# Patient Record
Sex: FEMALE | Race: OTHER | URBAN - METROPOLITAN AREA
[De-identification: names, ages, dates, MRNs, and addresses within clinical notes are randomized per-mention and may not be internally consistent; named-entity substitution may affect disease eponyms.]

---

## 2024-01-02 ENCOUNTER — INPATIENT (INPATIENT)
Facility: HOSPITAL | Age: 89
LOS: 2 days | Discharge: HOME | DRG: 871 | End: 2024-01-05
Attending: HOSPITALIST | Admitting: STUDENT IN AN ORGANIZED HEALTH CARE EDUCATION/TRAINING PROGRAM
Payer: MEDICARE

## 2024-01-02 VITALS — HEART RATE: 70 BPM | OXYGEN SATURATION: 96 % | RESPIRATION RATE: 16 BRPM

## 2024-01-02 LAB
ALBUMIN SERPL ELPH-MCNC: 3.5 G/DL — SIGNIFICANT CHANGE UP (ref 3.4–5)
ALBUMIN SERPL ELPH-MCNC: 3.5 G/DL — SIGNIFICANT CHANGE UP (ref 3.4–5)
ALP SERPL-CCNC: 62 U/L — SIGNIFICANT CHANGE UP (ref 40–120)
ALP SERPL-CCNC: 62 U/L — SIGNIFICANT CHANGE UP (ref 40–120)
ALT FLD-CCNC: 38 U/L — SIGNIFICANT CHANGE UP (ref 12–42)
ALT FLD-CCNC: 38 U/L — SIGNIFICANT CHANGE UP (ref 12–42)
ANION GAP SERPL CALC-SCNC: 11 MMOL/L — SIGNIFICANT CHANGE UP (ref 9–16)
ANION GAP SERPL CALC-SCNC: 11 MMOL/L — SIGNIFICANT CHANGE UP (ref 9–16)
APPEARANCE UR: ABNORMAL
APPEARANCE UR: ABNORMAL
APTT BLD: 26.4 SEC — SIGNIFICANT CHANGE UP (ref 24.5–35.6)
APTT BLD: 26.4 SEC — SIGNIFICANT CHANGE UP (ref 24.5–35.6)
AST SERPL-CCNC: 47 U/L — HIGH (ref 15–37)
AST SERPL-CCNC: 47 U/L — HIGH (ref 15–37)
BACTERIA # UR AUTO: ABNORMAL /HPF
BACTERIA # UR AUTO: ABNORMAL /HPF
BASOPHILS # BLD AUTO: 0.08 K/UL — SIGNIFICANT CHANGE UP (ref 0–0.2)
BASOPHILS # BLD AUTO: 0.08 K/UL — SIGNIFICANT CHANGE UP (ref 0–0.2)
BASOPHILS NFR BLD AUTO: 0.5 % — SIGNIFICANT CHANGE UP (ref 0–2)
BASOPHILS NFR BLD AUTO: 0.5 % — SIGNIFICANT CHANGE UP (ref 0–2)
BILIRUB SERPL-MCNC: 0.6 MG/DL — SIGNIFICANT CHANGE UP (ref 0.2–1.2)
BILIRUB SERPL-MCNC: 0.6 MG/DL — SIGNIFICANT CHANGE UP (ref 0.2–1.2)
BILIRUB UR-MCNC: NEGATIVE — SIGNIFICANT CHANGE UP
BILIRUB UR-MCNC: NEGATIVE — SIGNIFICANT CHANGE UP
BUN SERPL-MCNC: 26 MG/DL — HIGH (ref 7–23)
BUN SERPL-MCNC: 26 MG/DL — HIGH (ref 7–23)
CALCIUM SERPL-MCNC: 9.1 MG/DL — SIGNIFICANT CHANGE UP (ref 8.5–10.5)
CALCIUM SERPL-MCNC: 9.1 MG/DL — SIGNIFICANT CHANGE UP (ref 8.5–10.5)
CHLORIDE SERPL-SCNC: 100 MMOL/L — SIGNIFICANT CHANGE UP (ref 96–108)
CHLORIDE SERPL-SCNC: 100 MMOL/L — SIGNIFICANT CHANGE UP (ref 96–108)
CK MB BLD-MCNC: 0.61 % — SIGNIFICANT CHANGE UP
CK MB BLD-MCNC: 0.61 % — SIGNIFICANT CHANGE UP
CK MB CFR SERPL CALC: 2.4 NG/ML — SIGNIFICANT CHANGE UP (ref 0.5–3.6)
CK MB CFR SERPL CALC: 2.4 NG/ML — SIGNIFICANT CHANGE UP (ref 0.5–3.6)
CK SERPL-CCNC: 394 U/L — HIGH (ref 26–192)
CK SERPL-CCNC: 394 U/L — HIGH (ref 26–192)
CO2 SERPL-SCNC: 27 MMOL/L — SIGNIFICANT CHANGE UP (ref 22–31)
CO2 SERPL-SCNC: 27 MMOL/L — SIGNIFICANT CHANGE UP (ref 22–31)
COLOR SPEC: YELLOW — SIGNIFICANT CHANGE UP
COLOR SPEC: YELLOW — SIGNIFICANT CHANGE UP
CREAT SERPL-MCNC: 1.87 MG/DL — HIGH (ref 0.5–1.3)
CREAT SERPL-MCNC: 1.87 MG/DL — HIGH (ref 0.5–1.3)
DIFF PNL FLD: ABNORMAL
DIFF PNL FLD: ABNORMAL
EGFR: 25 ML/MIN/1.73M2 — LOW
EGFR: 25 ML/MIN/1.73M2 — LOW
EOSINOPHIL # BLD AUTO: 0.12 K/UL — SIGNIFICANT CHANGE UP (ref 0–0.5)
EOSINOPHIL # BLD AUTO: 0.12 K/UL — SIGNIFICANT CHANGE UP (ref 0–0.5)
EOSINOPHIL NFR BLD AUTO: 0.8 % — SIGNIFICANT CHANGE UP (ref 0–6)
EOSINOPHIL NFR BLD AUTO: 0.8 % — SIGNIFICANT CHANGE UP (ref 0–6)
ETHANOL SERPL-MCNC: <3 MG/DL — SIGNIFICANT CHANGE UP
ETHANOL SERPL-MCNC: <3 MG/DL — SIGNIFICANT CHANGE UP
FLUAV AG NPH QL: SIGNIFICANT CHANGE UP
FLUAV AG NPH QL: SIGNIFICANT CHANGE UP
FLUAV H1 2009 PAND RNA SPEC QL NAA+PROBE: SIGNIFICANT CHANGE UP
FLUAV H1 2009 PAND RNA SPEC QL NAA+PROBE: SIGNIFICANT CHANGE UP
FLUAV H1 RNA SPEC QL NAA+PROBE: SIGNIFICANT CHANGE UP
FLUAV H1 RNA SPEC QL NAA+PROBE: SIGNIFICANT CHANGE UP
FLUAV H3 RNA SPEC QL NAA+PROBE: SIGNIFICANT CHANGE UP
FLUAV H3 RNA SPEC QL NAA+PROBE: SIGNIFICANT CHANGE UP
FLUAV SUBTYP SPEC NAA+PROBE: SIGNIFICANT CHANGE UP
FLUAV SUBTYP SPEC NAA+PROBE: SIGNIFICANT CHANGE UP
FLUBV AG NPH QL: SIGNIFICANT CHANGE UP
FLUBV AG NPH QL: SIGNIFICANT CHANGE UP
FLUBV RNA SPEC QL NAA+PROBE: SIGNIFICANT CHANGE UP
FLUBV RNA SPEC QL NAA+PROBE: SIGNIFICANT CHANGE UP
GLUCOSE SERPL-MCNC: 139 MG/DL — HIGH (ref 70–99)
GLUCOSE SERPL-MCNC: 139 MG/DL — HIGH (ref 70–99)
GLUCOSE UR QL: NEGATIVE MG/DL — SIGNIFICANT CHANGE UP
GLUCOSE UR QL: NEGATIVE MG/DL — SIGNIFICANT CHANGE UP
HCOV PNL SPEC NAA+PROBE: DETECTED
HCOV PNL SPEC NAA+PROBE: DETECTED
HCT VFR BLD CALC: 41.4 % — SIGNIFICANT CHANGE UP (ref 34.5–45)
HCT VFR BLD CALC: 41.4 % — SIGNIFICANT CHANGE UP (ref 34.5–45)
HGB BLD-MCNC: 13.4 G/DL — SIGNIFICANT CHANGE UP (ref 11.5–15.5)
HGB BLD-MCNC: 13.4 G/DL — SIGNIFICANT CHANGE UP (ref 11.5–15.5)
IMM GRANULOCYTES NFR BLD AUTO: 0.7 % — SIGNIFICANT CHANGE UP (ref 0–0.9)
IMM GRANULOCYTES NFR BLD AUTO: 0.7 % — SIGNIFICANT CHANGE UP (ref 0–0.9)
INR BLD: 1.06 — SIGNIFICANT CHANGE UP (ref 0.85–1.18)
INR BLD: 1.06 — SIGNIFICANT CHANGE UP (ref 0.85–1.18)
KETONES UR-MCNC: NEGATIVE MG/DL — SIGNIFICANT CHANGE UP
KETONES UR-MCNC: NEGATIVE MG/DL — SIGNIFICANT CHANGE UP
LACTATE BLDV-MCNC: 1.7 MMOL/L — SIGNIFICANT CHANGE UP (ref 0.5–2)
LACTATE BLDV-MCNC: 1.7 MMOL/L — SIGNIFICANT CHANGE UP (ref 0.5–2)
LACTATE BLDV-MCNC: 3.1 MMOL/L — HIGH (ref 0.5–2)
LACTATE BLDV-MCNC: 3.1 MMOL/L — HIGH (ref 0.5–2)
LEUKOCYTE ESTERASE UR-ACNC: ABNORMAL
LEUKOCYTE ESTERASE UR-ACNC: ABNORMAL
LYMPHOCYTES # BLD AUTO: 21.3 % — SIGNIFICANT CHANGE UP (ref 13–44)
LYMPHOCYTES # BLD AUTO: 21.3 % — SIGNIFICANT CHANGE UP (ref 13–44)
LYMPHOCYTES # BLD AUTO: 3.27 K/UL — SIGNIFICANT CHANGE UP (ref 1–3.3)
LYMPHOCYTES # BLD AUTO: 3.27 K/UL — SIGNIFICANT CHANGE UP (ref 1–3.3)
MAGNESIUM SERPL-MCNC: 2 MG/DL — SIGNIFICANT CHANGE UP (ref 1.6–2.6)
MAGNESIUM SERPL-MCNC: 2 MG/DL — SIGNIFICANT CHANGE UP (ref 1.6–2.6)
MANUAL SMEAR VERIFICATION: SIGNIFICANT CHANGE UP
MANUAL SMEAR VERIFICATION: SIGNIFICANT CHANGE UP
MCHC RBC-ENTMCNC: 30.9 PG — SIGNIFICANT CHANGE UP (ref 27–34)
MCHC RBC-ENTMCNC: 30.9 PG — SIGNIFICANT CHANGE UP (ref 27–34)
MCHC RBC-ENTMCNC: 32.4 GM/DL — SIGNIFICANT CHANGE UP (ref 32–36)
MCHC RBC-ENTMCNC: 32.4 GM/DL — SIGNIFICANT CHANGE UP (ref 32–36)
MCV RBC AUTO: 95.4 FL — SIGNIFICANT CHANGE UP (ref 80–100)
MCV RBC AUTO: 95.4 FL — SIGNIFICANT CHANGE UP (ref 80–100)
MONOCYTES # BLD AUTO: 1.93 K/UL — HIGH (ref 0–0.9)
MONOCYTES # BLD AUTO: 1.93 K/UL — HIGH (ref 0–0.9)
MONOCYTES NFR BLD AUTO: 12.6 % — SIGNIFICANT CHANGE UP (ref 2–14)
MONOCYTES NFR BLD AUTO: 12.6 % — SIGNIFICANT CHANGE UP (ref 2–14)
NEUTROPHILS # BLD AUTO: 9.82 K/UL — HIGH (ref 1.8–7.4)
NEUTROPHILS # BLD AUTO: 9.82 K/UL — HIGH (ref 1.8–7.4)
NEUTROPHILS NFR BLD AUTO: 64.1 % — SIGNIFICANT CHANGE UP (ref 43–77)
NEUTROPHILS NFR BLD AUTO: 64.1 % — SIGNIFICANT CHANGE UP (ref 43–77)
NITRITE UR-MCNC: NEGATIVE — SIGNIFICANT CHANGE UP
NITRITE UR-MCNC: NEGATIVE — SIGNIFICANT CHANGE UP
NRBC # BLD: 0 /100 WBCS — SIGNIFICANT CHANGE UP (ref 0–0)
NRBC # BLD: 0 /100 WBCS — SIGNIFICANT CHANGE UP (ref 0–0)
PH UR: 6.5 — SIGNIFICANT CHANGE UP (ref 5–8)
PH UR: 6.5 — SIGNIFICANT CHANGE UP (ref 5–8)
PLAT MORPH BLD: NORMAL — SIGNIFICANT CHANGE UP
PLAT MORPH BLD: NORMAL — SIGNIFICANT CHANGE UP
PLATELET # BLD AUTO: 414 K/UL — HIGH (ref 150–400)
PLATELET # BLD AUTO: 414 K/UL — HIGH (ref 150–400)
POTASSIUM SERPL-MCNC: 3.4 MMOL/L — LOW (ref 3.5–5.3)
POTASSIUM SERPL-MCNC: 3.4 MMOL/L — LOW (ref 3.5–5.3)
POTASSIUM SERPL-SCNC: 3.4 MMOL/L — LOW (ref 3.5–5.3)
POTASSIUM SERPL-SCNC: 3.4 MMOL/L — LOW (ref 3.5–5.3)
PROT SERPL-MCNC: 7.3 G/DL — SIGNIFICANT CHANGE UP (ref 6.4–8.2)
PROT SERPL-MCNC: 7.3 G/DL — SIGNIFICANT CHANGE UP (ref 6.4–8.2)
PROT UR-MCNC: ABNORMAL MG/DL
PROT UR-MCNC: ABNORMAL MG/DL
PROTHROM AB SERPL-ACNC: 11.6 SEC — SIGNIFICANT CHANGE UP (ref 9.5–13)
PROTHROM AB SERPL-ACNC: 11.6 SEC — SIGNIFICANT CHANGE UP (ref 9.5–13)
RAPID RVP RESULT: DETECTED
RAPID RVP RESULT: DETECTED
RBC # BLD: 4.34 M/UL — SIGNIFICANT CHANGE UP (ref 3.8–5.2)
RBC # BLD: 4.34 M/UL — SIGNIFICANT CHANGE UP (ref 3.8–5.2)
RBC # FLD: 14 % — SIGNIFICANT CHANGE UP (ref 10.3–14.5)
RBC # FLD: 14 % — SIGNIFICANT CHANGE UP (ref 10.3–14.5)
RBC BLD AUTO: NORMAL — SIGNIFICANT CHANGE UP
RBC BLD AUTO: NORMAL — SIGNIFICANT CHANGE UP
RBC CASTS # UR COMP ASSIST: 11 /HPF — HIGH (ref 0–4)
RBC CASTS # UR COMP ASSIST: 11 /HPF — HIGH (ref 0–4)
RSV RNA NPH QL NAA+NON-PROBE: SIGNIFICANT CHANGE UP
RSV RNA NPH QL NAA+NON-PROBE: SIGNIFICANT CHANGE UP
SARS-COV-2 RNA SPEC QL NAA+PROBE: SIGNIFICANT CHANGE UP
SODIUM SERPL-SCNC: 138 MMOL/L — SIGNIFICANT CHANGE UP (ref 132–145)
SODIUM SERPL-SCNC: 138 MMOL/L — SIGNIFICANT CHANGE UP (ref 132–145)
SP GR SPEC: 1.04 — HIGH (ref 1–1.03)
SP GR SPEC: 1.04 — HIGH (ref 1–1.03)
TROPONIN I, HIGH SENSITIVITY RESULT: 103.8 NG/L — HIGH
TROPONIN I, HIGH SENSITIVITY RESULT: 103.8 NG/L — HIGH
TROPONIN I, HIGH SENSITIVITY RESULT: 16.1 NG/L — SIGNIFICANT CHANGE UP
TROPONIN I, HIGH SENSITIVITY RESULT: 16.1 NG/L — SIGNIFICANT CHANGE UP
TROPONIN I, HIGH SENSITIVITY RESULT: 18.4 NG/L — SIGNIFICANT CHANGE UP
TROPONIN I, HIGH SENSITIVITY RESULT: 18.4 NG/L — SIGNIFICANT CHANGE UP
UROBILINOGEN FLD QL: 1 MG/DL — SIGNIFICANT CHANGE UP (ref 0.2–1)
UROBILINOGEN FLD QL: 1 MG/DL — SIGNIFICANT CHANGE UP (ref 0.2–1)
WBC # BLD: 15.32 K/UL — HIGH (ref 3.8–10.5)
WBC # BLD: 15.32 K/UL — HIGH (ref 3.8–10.5)
WBC # FLD AUTO: 15.32 K/UL — HIGH (ref 3.8–10.5)
WBC # FLD AUTO: 15.32 K/UL — HIGH (ref 3.8–10.5)
WBC UR QL: 4 /HPF — SIGNIFICANT CHANGE UP (ref 0–5)
WBC UR QL: 4 /HPF — SIGNIFICANT CHANGE UP (ref 0–5)

## 2024-01-02 PROCEDURE — 99291 CRITICAL CARE FIRST HOUR: CPT

## 2024-01-02 PROCEDURE — 70450 CT HEAD/BRAIN W/O DYE: CPT | Mod: 26,59

## 2024-01-02 PROCEDURE — 0042T: CPT

## 2024-01-02 PROCEDURE — 70496 CT ANGIOGRAPHY HEAD: CPT | Mod: 26

## 2024-01-02 PROCEDURE — 70498 CT ANGIOGRAPHY NECK: CPT | Mod: 26

## 2024-01-02 PROCEDURE — 71045 X-RAY EXAM CHEST 1 VIEW: CPT | Mod: 26

## 2024-01-02 RX ORDER — CEFTRIAXONE 500 MG/1
1000 INJECTION, POWDER, FOR SOLUTION INTRAMUSCULAR; INTRAVENOUS ONCE
Refills: 0 | Status: COMPLETED | OUTPATIENT
Start: 2024-01-02 | End: 2024-01-02

## 2024-01-02 RX ORDER — SODIUM CHLORIDE 9 MG/ML
1000 INJECTION INTRAMUSCULAR; INTRAVENOUS; SUBCUTANEOUS ONCE
Refills: 0 | Status: COMPLETED | OUTPATIENT
Start: 2024-01-02 | End: 2024-01-02

## 2024-01-02 RX ORDER — IBUPROFEN 200 MG
800 TABLET ORAL ONCE
Refills: 0 | Status: COMPLETED | OUTPATIENT
Start: 2024-01-02 | End: 2024-01-02

## 2024-01-02 RX ORDER — ACETAMINOPHEN 500 MG
975 TABLET ORAL ONCE
Refills: 0 | Status: COMPLETED | OUTPATIENT
Start: 2024-01-02 | End: 2024-01-02

## 2024-01-02 RX ORDER — VANCOMYCIN HCL 1 G
1000 VIAL (EA) INTRAVENOUS ONCE
Refills: 0 | Status: COMPLETED | OUTPATIENT
Start: 2024-01-02 | End: 2024-01-02

## 2024-01-02 RX ADMIN — SODIUM CHLORIDE 1000 MILLILITER(S): 9 INJECTION INTRAMUSCULAR; INTRAVENOUS; SUBCUTANEOUS at 14:30

## 2024-01-02 RX ADMIN — CEFTRIAXONE 100 MILLIGRAM(S): 500 INJECTION, POWDER, FOR SOLUTION INTRAMUSCULAR; INTRAVENOUS at 15:47

## 2024-01-02 RX ADMIN — Medication 975 MILLIGRAM(S): at 17:48

## 2024-01-02 RX ADMIN — Medication 250 MILLIGRAM(S): at 20:29

## 2024-01-02 RX ADMIN — SODIUM CHLORIDE 1000 MILLILITER(S): 9 INJECTION INTRAMUSCULAR; INTRAVENOUS; SUBCUTANEOUS at 22:48

## 2024-01-02 RX ADMIN — Medication 800 MILLIGRAM(S): at 17:49

## 2024-01-02 NOTE — ED ADULT NURSE REASSESSMENT NOTE - NS ED NURSE REASSESS COMMENT FT1
Provider ADAMS Murphy aware of pt's BP dropping. Pt placed in Trendelenburg. Pt not dizzy/lightheaded, not tachy. No sx of low BP, awake and alert. IVFs started.

## 2024-01-02 NOTE — ED PROVIDER NOTE - ATTENDING APP SHARED VISIT CONTRIBUTION OF CARE
Patient with altered mental status, initially called as code stroke. Stroke w/u negative, patient then became febrile. Admitted for sepsis.    Gen: NAD. oriented to person and place, not time. HEENT: NCAT, mmm   Chest: RRR, nl S1 and S2, no m/r/g. Resp: CTAB, no w/r/r  Abd: nl BS, soft, nt/nd. Ext: Warm, dry  Neuro: CN II-XII intact, normal and equal strength, sensation, and reflexes bilaterally  Skin: no rash    MDM: Patient with sepsis, unknown source. Requires inpatient admission for IV antibiotics and IV fluids.    Critical care time: 60 minutes.

## 2024-01-02 NOTE — ED PROVIDER NOTE - ATTENDING SHARED VISIT SELECTORS
CM reached out to patient's daughter Tracy Bertrand to discuss the recommendation that was made for STR for patient  Tracy Bertrand is familiar with the process as her mom is currently at [facility] and the preference would be for him to go there if possible  Tracy Bertrand is agreeable at this time to a blanket referral and understands that CM department will provide updates of which facilities would be able to accommodate patient's needs  CM also provided update that PT attempted to work with patient today, however he refused to work with them  She verbalized that she would talk with him about this further  Tracy Bertrand stated that she did get the POA paperwork notarized and is now officially POA  She will email CM with copies of this  [facility] referral made via [facility] [place]  CM department will continue to follow to assist with discharge coordination  EKG/Xray images(s)

## 2024-01-02 NOTE — ED PROVIDER NOTE - OBJECTIVE STATEMENT
88 yo female, Uzbek speaking, here with family, hx HTN, HLD, hypothyroidism, BIB nephew for evaluation for altered mental status. Nephew reports they were driving from [place], patient was sitting in passenger seat when he started he hear "snoring sounds" and then patient vomited shortly afterwards, she has been unresponsive for about 45 minutes now and poorly following commands. Stroke code called in triage. BP 60/40 in triage, poorly follows commands. 89 yo female, Romanian speaking, here with family, hx HTN, HLD, hypothyroidism, BIB nephew for evaluation for altered mental status. Nephew reports they were driving from [place], patient was sitting in passenger seat when he started he hear "snoring sounds" and then patient vomited shortly afterwards, she has been unresponsive for about 45 minutes now and poorly following commands. Stroke code called in triage. BP 60/40 in triage, poorly follows commands. 89 yo female, Chinese speaking, here with family, hx HTN, HLD, hypothyroidism, "mini stroke" previously with no deficits, BIB nephew for evaluation for altered mental status. Nephew reports they were driving from [place], patient was sitting in passenger seat when he started he hear "snoring sounds" and then patient vomited shortly afterwards, she has been unresponsive for about 45 minutes now and poorly following commands. Stroke code called in triage. BP 60/40 in triage, poorly follows commands. 88 yo female, Greenlandic speaking, here with family, hx HTN, HLD, hypothyroidism, "mini stroke" previously with no deficits, BIB nephew for evaluation for altered mental status. Nephew reports they were driving from [place], patient was sitting in passenger seat when he started he hear "snoring sounds" and then patient vomited shortly afterwards, she has been unresponsive for about 45 minutes now and poorly following commands. Stroke code called in triage. BP 60/40 in triage, poorly follows commands.

## 2024-01-02 NOTE — ED ADULT NURSE REASSESSMENT NOTE - NS ED NURSE REASSESS COMMENT FT1
Break coverage for sonido ponce spoke with phu osborn to be admitted medically and will cancel order for NIH observation

## 2024-01-02 NOTE — ED PROVIDER NOTE - CLINICAL SUMMARY MEDICAL DECISION MAKING FREE TEXT BOX
stroke code called in triage, patient altered, poorly following commands, last well known 45 minutes ago, will place stroke workup and sepsis workup, IVF, IV antibiotics, cxr, reassess.

## 2024-01-02 NOTE — ED PROVIDER NOTE - NIH STROKE SCALE: TOTAL
0

## 2024-01-02 NOTE — ED ADULT NURSE NOTE - NSFALLHARMRISKINTERV_ED_ALL_ED
Assistance OOB with selected safe patient handling equipment if applicable/Assistance with ambulation/Communicate risk of Fall with Harm to all staff, patient, and family/Encourage patient to sit up slowly, dangle for a short time, stand at bedside before walking/Monitor gait and stability/Monitor for mental status changes and reorient to person, place, and time, as needed/Orthostatic vital signs/Provide visual cue: red socks, yellow wristband, yellow gown, etc/Reinforce activity limits and safety measures with patient and family/Toileting schedule using arm’s reach rule for commode and bathroom/Use of alarms - bed, stretcher, chair and/or video monitoring/Bed in lowest position, wheels locked, appropriate side rails in place/Call bell, personal items and telephone in reach/Instruct patient to call for assistance before getting out of bed/chair/stretcher/Non-slip footwear applied when patient is off stretcher/[place] to call system/Physically safe environment - no spills, clutter or unnecessary equipment/Purposeful Proactive Rounding/Room/bathroom lighting operational, light cord in reach Assistance OOB with selected safe patient handling equipment if applicable/Assistance with ambulation/Communicate risk of Fall with Harm to all staff, patient, and family/Encourage patient to sit up slowly, dangle for a short time, stand at bedside before walking/Monitor gait and stability/Monitor for mental status changes and reorient to person, place, and time, as needed/Orthostatic vital signs/Provide visual cue: red socks, yellow wristband, yellow gown, etc/Reinforce activity limits and safety measures with patient and family/Toileting schedule using arm’s reach rule for commode and bathroom/Use of alarms - bed, stretcher, chair and/or video monitoring/Bed in lowest position, wheels locked, appropriate side rails in place/Call bell, personal items and telephone in reach/Instruct patient to call for assistance before getting out of bed/chair/stretcher/Non-slip footwear applied when patient is off stretcher/[place] to call system/Physically safe environment - no spills, clutter or unnecessary equipment/Purposeful Proactive Rounding/Room/bathroom lighting operational, light cord in reach

## 2024-01-02 NOTE — ED ADULT NURSE NOTE - CAS EDN DISCHARGE INTERVENTIONS
This note was copied from a baby's chart.  RN in to check to see how breastfeeding/pumping has been going.  Mom is an experienced breast feeder.  Pt will be DC today. Mom was pumping more yesterday but today infant has been nursing more. When RN came into the room Mom was nursing infant on the R side in the cradle hold. Good breast tissue tugging, audible swallowing.  Mom denies any questions or concerns.  Support given.  Enc to call with any needs.      Time spent with pt: 10 min    MARICARMEN Martinez, RN, IBCLC, CLC     IV intact

## 2024-01-02 NOTE — ED ADULT TRIAGE NOTE - CHIEF COMPLAINT QUOTE
as per son and grandson, onset of dizziness and loss f speech 45 mins ago , at triage pt able to obey commands but non verbal , phu osborn reviewed pt a triage Stroke code called

## 2024-01-02 NOTE — ED ADULT NURSE NOTE - OBJECTIVE STATEMENT
Assumed care of pt in CT scan. Per family members pt went unresponsive in car and vomited on herself while making "weird noises." Pt then was very disoriented and not herself, family believes she passed out briefly. At present pt can follow commands, oriented to self but not location/situation. Pt reports feeling very tired and dizzy. Pt noted  to have cough, per family this is new and was not present the past few days.

## 2024-01-02 NOTE — ED PROVIDER NOTE - PHYSICAL EXAMINATION
CONSTITUTIONAL: no apparent distress.   	HEAD: Normocephalic; atraumatic.   	EYES:  conjunctiva and sclera clear  	ENT: normal nose; no rhinorrhea; normal pharynx with no erythema or lesions. dry mucous membranes  	NECK: Supple; non-tender;   	CARDIOVASCULAR: Normal S1, S2; no murmurs, rubs, or gallops. Regular rate and rhythm.   	RESPIRATORY: Breathing easily; breath sounds clear and equal bilaterally; no wheezes, rhonchi, or rales.  	GI: Soft; non-distended; non-tender  	EXT: GOMEZ x 4. currently in wheelchair, unable to assess gait.   	SKIN: Normal for age and race; warm; dry; good turgor; no apparent lesions or rash.   	NEURO: slowly following commands. able to tell me her name, unable to tell me date and location.

## 2024-01-02 NOTE — ED ADULT NURSE REASSESSMENT NOTE - NS ED NURSE REASSESS COMMENT FT1
Unable to document PO trial at correct time so documented at 1430 however trial was conducted at 1745 and pt passed.

## 2024-01-03 DIAGNOSIS — R79.89 OTHER SPECIFIED ABNORMAL FINDINGS OF BLOOD CHEMISTRY: ICD-10-CM

## 2024-01-03 DIAGNOSIS — Z29.9 ENCOUNTER FOR PROPHYLACTIC MEASURES, UNSPECIFIED: ICD-10-CM

## 2024-01-03 DIAGNOSIS — A41.9 SEPSIS, UNSPECIFIED ORGANISM: ICD-10-CM

## 2024-01-03 DIAGNOSIS — I10 ESSENTIAL (PRIMARY) HYPERTENSION: ICD-10-CM

## 2024-01-03 DIAGNOSIS — N17.9 ACUTE KIDNEY FAILURE, UNSPECIFIED: ICD-10-CM

## 2024-01-03 DIAGNOSIS — G93.41 METABOLIC ENCEPHALOPATHY: ICD-10-CM

## 2024-01-03 DIAGNOSIS — I21.A1 MYOCARDIAL INFARCTION TYPE 2: ICD-10-CM

## 2024-01-03 DIAGNOSIS — R55 SYNCOPE AND COLLAPSE: ICD-10-CM

## 2024-01-03 DIAGNOSIS — E03.9 HYPOTHYROIDISM, UNSPECIFIED: ICD-10-CM

## 2024-01-03 DIAGNOSIS — I63.9 CEREBRAL INFARCTION, UNSPECIFIED: ICD-10-CM

## 2024-01-03 LAB
ANION GAP SERPL CALC-SCNC: 12 MMOL/L — SIGNIFICANT CHANGE UP (ref 5–17)
ANION GAP SERPL CALC-SCNC: 12 MMOL/L — SIGNIFICANT CHANGE UP (ref 5–17)
APPEARANCE UR: ABNORMAL
APPEARANCE UR: ABNORMAL
APTT BLD: 29.9 SEC — SIGNIFICANT CHANGE UP (ref 24.5–35.6)
APTT BLD: 29.9 SEC — SIGNIFICANT CHANGE UP (ref 24.5–35.6)
BACTERIA # UR AUTO: ABNORMAL /HPF
BACTERIA # UR AUTO: ABNORMAL /HPF
BASOPHILS # BLD AUTO: 0.09 K/UL — SIGNIFICANT CHANGE UP (ref 0–0.2)
BASOPHILS # BLD AUTO: 0.09 K/UL — SIGNIFICANT CHANGE UP (ref 0–0.2)
BASOPHILS NFR BLD AUTO: 0.4 % — SIGNIFICANT CHANGE UP (ref 0–2)
BASOPHILS NFR BLD AUTO: 0.4 % — SIGNIFICANT CHANGE UP (ref 0–2)
BILIRUB UR-MCNC: NEGATIVE — SIGNIFICANT CHANGE UP
BILIRUB UR-MCNC: NEGATIVE — SIGNIFICANT CHANGE UP
BLD GP AB SCN SERPL QL: NEGATIVE — SIGNIFICANT CHANGE UP
BLD GP AB SCN SERPL QL: NEGATIVE — SIGNIFICANT CHANGE UP
BUN SERPL-MCNC: 26 MG/DL — HIGH (ref 7–23)
BUN SERPL-MCNC: 26 MG/DL — HIGH (ref 7–23)
CALCIUM SERPL-MCNC: 8.4 MG/DL — SIGNIFICANT CHANGE UP (ref 8.4–10.5)
CALCIUM SERPL-MCNC: 8.4 MG/DL — SIGNIFICANT CHANGE UP (ref 8.4–10.5)
CAST: 6 /LPF — HIGH (ref 0–4)
CAST: 6 /LPF — HIGH (ref 0–4)
CHLORIDE SERPL-SCNC: 108 MMOL/L — SIGNIFICANT CHANGE UP (ref 96–108)
CHLORIDE SERPL-SCNC: 108 MMOL/L — SIGNIFICANT CHANGE UP (ref 96–108)
CK MB CFR SERPL CALC: 8.6 NG/ML — HIGH (ref 0–6.7)
CK MB CFR SERPL CALC: 8.6 NG/ML — HIGH (ref 0–6.7)
CK SERPL-CCNC: 1381 U/L — HIGH (ref 25–170)
CK SERPL-CCNC: 1381 U/L — HIGH (ref 25–170)
CO2 SERPL-SCNC: 23 MMOL/L — SIGNIFICANT CHANGE UP (ref 22–31)
CO2 SERPL-SCNC: 23 MMOL/L — SIGNIFICANT CHANGE UP (ref 22–31)
COLOR SPEC: YELLOW — SIGNIFICANT CHANGE UP
COLOR SPEC: YELLOW — SIGNIFICANT CHANGE UP
CREAT SERPL-MCNC: 1.39 MG/DL — HIGH (ref 0.5–1.3)
CREAT SERPL-MCNC: 1.39 MG/DL — HIGH (ref 0.5–1.3)
DIFF PNL FLD: ABNORMAL
DIFF PNL FLD: ABNORMAL
EGFR: 36 ML/MIN/1.73M2 — LOW
EGFR: 36 ML/MIN/1.73M2 — LOW
EOSINOPHIL # BLD AUTO: 0.02 K/UL — SIGNIFICANT CHANGE UP (ref 0–0.5)
EOSINOPHIL # BLD AUTO: 0.02 K/UL — SIGNIFICANT CHANGE UP (ref 0–0.5)
EOSINOPHIL NFR BLD AUTO: 0.1 % — SIGNIFICANT CHANGE UP (ref 0–6)
EOSINOPHIL NFR BLD AUTO: 0.1 % — SIGNIFICANT CHANGE UP (ref 0–6)
GLUCOSE SERPL-MCNC: 96 MG/DL — SIGNIFICANT CHANGE UP (ref 70–99)
GLUCOSE SERPL-MCNC: 96 MG/DL — SIGNIFICANT CHANGE UP (ref 70–99)
GLUCOSE UR QL: NEGATIVE MG/DL — SIGNIFICANT CHANGE UP
GLUCOSE UR QL: NEGATIVE MG/DL — SIGNIFICANT CHANGE UP
HCT VFR BLD CALC: 36.6 % — SIGNIFICANT CHANGE UP (ref 34.5–45)
HCT VFR BLD CALC: 36.6 % — SIGNIFICANT CHANGE UP (ref 34.5–45)
HGB BLD-MCNC: 12.3 G/DL — SIGNIFICANT CHANGE UP (ref 11.5–15.5)
HGB BLD-MCNC: 12.3 G/DL — SIGNIFICANT CHANGE UP (ref 11.5–15.5)
IMM GRANULOCYTES NFR BLD AUTO: 0.4 % — SIGNIFICANT CHANGE UP (ref 0–0.9)
IMM GRANULOCYTES NFR BLD AUTO: 0.4 % — SIGNIFICANT CHANGE UP (ref 0–0.9)
INR BLD: 1 — SIGNIFICANT CHANGE UP (ref 0.85–1.18)
INR BLD: 1 — SIGNIFICANT CHANGE UP (ref 0.85–1.18)
KETONES UR-MCNC: NEGATIVE MG/DL — SIGNIFICANT CHANGE UP
KETONES UR-MCNC: NEGATIVE MG/DL — SIGNIFICANT CHANGE UP
LACTATE SERPL-SCNC: 1.1 MMOL/L — SIGNIFICANT CHANGE UP (ref 0.5–2)
LACTATE SERPL-SCNC: 1.1 MMOL/L — SIGNIFICANT CHANGE UP (ref 0.5–2)
LEUKOCYTE ESTERASE UR-ACNC: ABNORMAL
LEUKOCYTE ESTERASE UR-ACNC: ABNORMAL
LYMPHOCYTES # BLD AUTO: 2.08 K/UL — SIGNIFICANT CHANGE UP (ref 1–3.3)
LYMPHOCYTES # BLD AUTO: 2.08 K/UL — SIGNIFICANT CHANGE UP (ref 1–3.3)
LYMPHOCYTES # BLD AUTO: 9.8 % — LOW (ref 13–44)
LYMPHOCYTES # BLD AUTO: 9.8 % — LOW (ref 13–44)
MAGNESIUM SERPL-MCNC: 1.6 MG/DL — SIGNIFICANT CHANGE UP (ref 1.6–2.6)
MAGNESIUM SERPL-MCNC: 1.6 MG/DL — SIGNIFICANT CHANGE UP (ref 1.6–2.6)
MCHC RBC-ENTMCNC: 31 PG — SIGNIFICANT CHANGE UP (ref 27–34)
MCHC RBC-ENTMCNC: 31 PG — SIGNIFICANT CHANGE UP (ref 27–34)
MCHC RBC-ENTMCNC: 33.6 GM/DL — SIGNIFICANT CHANGE UP (ref 32–36)
MCHC RBC-ENTMCNC: 33.6 GM/DL — SIGNIFICANT CHANGE UP (ref 32–36)
MCV RBC AUTO: 92.2 FL — SIGNIFICANT CHANGE UP (ref 80–100)
MCV RBC AUTO: 92.2 FL — SIGNIFICANT CHANGE UP (ref 80–100)
MONOCYTES # BLD AUTO: 1.54 K/UL — HIGH (ref 0–0.9)
MONOCYTES # BLD AUTO: 1.54 K/UL — HIGH (ref 0–0.9)
MONOCYTES NFR BLD AUTO: 7.3 % — SIGNIFICANT CHANGE UP (ref 2–14)
MONOCYTES NFR BLD AUTO: 7.3 % — SIGNIFICANT CHANGE UP (ref 2–14)
MRSA PCR RESULT.: NEGATIVE — SIGNIFICANT CHANGE UP
MRSA PCR RESULT.: NEGATIVE — SIGNIFICANT CHANGE UP
NEUTROPHILS # BLD AUTO: 17.33 K/UL — HIGH (ref 1.8–7.4)
NEUTROPHILS # BLD AUTO: 17.33 K/UL — HIGH (ref 1.8–7.4)
NEUTROPHILS NFR BLD AUTO: 82 % — HIGH (ref 43–77)
NEUTROPHILS NFR BLD AUTO: 82 % — HIGH (ref 43–77)
NITRITE UR-MCNC: NEGATIVE — SIGNIFICANT CHANGE UP
NITRITE UR-MCNC: NEGATIVE — SIGNIFICANT CHANGE UP
NRBC # BLD: 0 /100 WBCS — SIGNIFICANT CHANGE UP (ref 0–0)
NRBC # BLD: 0 /100 WBCS — SIGNIFICANT CHANGE UP (ref 0–0)
PH UR: 5.5 — SIGNIFICANT CHANGE UP (ref 5–8)
PH UR: 5.5 — SIGNIFICANT CHANGE UP (ref 5–8)
PHOSPHATE SERPL-MCNC: 3.7 MG/DL — SIGNIFICANT CHANGE UP (ref 2.5–4.5)
PHOSPHATE SERPL-MCNC: 3.7 MG/DL — SIGNIFICANT CHANGE UP (ref 2.5–4.5)
PLATELET # BLD AUTO: 316 K/UL — SIGNIFICANT CHANGE UP (ref 150–400)
PLATELET # BLD AUTO: 316 K/UL — SIGNIFICANT CHANGE UP (ref 150–400)
POTASSIUM SERPL-MCNC: 3.6 MMOL/L — SIGNIFICANT CHANGE UP (ref 3.5–5.3)
POTASSIUM SERPL-MCNC: 3.6 MMOL/L — SIGNIFICANT CHANGE UP (ref 3.5–5.3)
POTASSIUM SERPL-SCNC: 3.6 MMOL/L — SIGNIFICANT CHANGE UP (ref 3.5–5.3)
POTASSIUM SERPL-SCNC: 3.6 MMOL/L — SIGNIFICANT CHANGE UP (ref 3.5–5.3)
PROCALCITONIN SERPL-MCNC: 2.44 NG/ML — HIGH (ref 0.02–0.1)
PROCALCITONIN SERPL-MCNC: 2.44 NG/ML — HIGH (ref 0.02–0.1)
PROT UR-MCNC: 30 MG/DL
PROT UR-MCNC: 30 MG/DL
PROTHROM AB SERPL-ACNC: 11.4 SEC — SIGNIFICANT CHANGE UP (ref 9.5–13)
PROTHROM AB SERPL-ACNC: 11.4 SEC — SIGNIFICANT CHANGE UP (ref 9.5–13)
RBC # BLD: 3.97 M/UL — SIGNIFICANT CHANGE UP (ref 3.8–5.2)
RBC # BLD: 3.97 M/UL — SIGNIFICANT CHANGE UP (ref 3.8–5.2)
RBC # FLD: 14.1 % — SIGNIFICANT CHANGE UP (ref 10.3–14.5)
RBC # FLD: 14.1 % — SIGNIFICANT CHANGE UP (ref 10.3–14.5)
RBC CASTS # UR COMP ASSIST: 214 /HPF — HIGH (ref 0–4)
RBC CASTS # UR COMP ASSIST: 214 /HPF — HIGH (ref 0–4)
RH IG SCN BLD-IMP: POSITIVE — SIGNIFICANT CHANGE UP
RH IG SCN BLD-IMP: POSITIVE — SIGNIFICANT CHANGE UP
S AUREUS DNA NOSE QL NAA+PROBE: NEGATIVE — SIGNIFICANT CHANGE UP
S AUREUS DNA NOSE QL NAA+PROBE: NEGATIVE — SIGNIFICANT CHANGE UP
SODIUM SERPL-SCNC: 143 MMOL/L — SIGNIFICANT CHANGE UP (ref 135–145)
SODIUM SERPL-SCNC: 143 MMOL/L — SIGNIFICANT CHANGE UP (ref 135–145)
SP GR SPEC: 1.02 — SIGNIFICANT CHANGE UP (ref 1–1.03)
SP GR SPEC: 1.02 — SIGNIFICANT CHANGE UP (ref 1–1.03)
SQUAMOUS # UR AUTO: 7 /HPF — HIGH (ref 0–5)
SQUAMOUS # UR AUTO: 7 /HPF — HIGH (ref 0–5)
TROPONIN T, HIGH SENSITIVITY RESULT: 105 NG/L — CRITICAL HIGH (ref 0–51)
TROPONIN T, HIGH SENSITIVITY RESULT: 105 NG/L — CRITICAL HIGH (ref 0–51)
TROPONIN T, HIGH SENSITIVITY RESULT: 74 NG/L — CRITICAL HIGH (ref 0–51)
TROPONIN T, HIGH SENSITIVITY RESULT: 74 NG/L — CRITICAL HIGH (ref 0–51)
TSH SERPL-MCNC: 1.92 UIU/ML — SIGNIFICANT CHANGE UP (ref 0.27–4.2)
TSH SERPL-MCNC: 1.92 UIU/ML — SIGNIFICANT CHANGE UP (ref 0.27–4.2)
UROBILINOGEN FLD QL: 0.2 MG/DL — SIGNIFICANT CHANGE UP (ref 0.2–1)
UROBILINOGEN FLD QL: 0.2 MG/DL — SIGNIFICANT CHANGE UP (ref 0.2–1)
WBC # BLD: 21.14 K/UL — HIGH (ref 3.8–10.5)
WBC # BLD: 21.14 K/UL — HIGH (ref 3.8–10.5)
WBC # FLD AUTO: 21.14 K/UL — HIGH (ref 3.8–10.5)
WBC # FLD AUTO: 21.14 K/UL — HIGH (ref 3.8–10.5)
WBC UR QL: 15 /HPF — HIGH (ref 0–5)
WBC UR QL: 15 /HPF — HIGH (ref 0–5)

## 2024-01-03 PROCEDURE — 71045 X-RAY EXAM CHEST 1 VIEW: CPT | Mod: 26,XU

## 2024-01-03 PROCEDURE — 71046 X-RAY EXAM CHEST 2 VIEWS: CPT | Mod: 26

## 2024-01-03 PROCEDURE — 99223 1ST HOSP IP/OBS HIGH 75: CPT | Mod: GC

## 2024-01-03 PROCEDURE — 93306 TTE W/DOPPLER COMPLETE: CPT | Mod: 26

## 2024-01-03 RX ORDER — POTASSIUM CHLORIDE 20 MEQ
20 PACKET (EA) ORAL ONCE
Refills: 0 | Status: COMPLETED | OUTPATIENT
Start: 2024-01-03 | End: 2024-01-03

## 2024-01-03 RX ORDER — LEVOTHYROXINE SODIUM 125 MCG
75 TABLET ORAL DAILY
Refills: 0 | Status: DISCONTINUED | OUTPATIENT
Start: 2024-01-03 | End: 2024-01-05

## 2024-01-03 RX ORDER — ONDANSETRON 8 MG/1
4 TABLET, FILM COATED ORAL EVERY 8 HOURS
Refills: 0 | Status: DISCONTINUED | OUTPATIENT
Start: 2024-01-03 | End: 2024-01-05

## 2024-01-03 RX ORDER — ATORVASTATIN CALCIUM 80 MG/1
40 TABLET, FILM COATED ORAL AT BEDTIME
Refills: 0 | Status: DISCONTINUED | OUTPATIENT
Start: 2024-01-03 | End: 2024-01-05

## 2024-01-03 RX ORDER — ACETAMINOPHEN 500 MG
650 TABLET ORAL EVERY 6 HOURS
Refills: 0 | Status: DISCONTINUED | OUTPATIENT
Start: 2024-01-03 | End: 2024-01-05

## 2024-01-03 RX ORDER — HEPARIN SODIUM 5000 [USP'U]/ML
5000 INJECTION INTRAVENOUS; SUBCUTANEOUS EVERY 8 HOURS
Refills: 0 | Status: DISCONTINUED | OUTPATIENT
Start: 2024-01-03 | End: 2024-01-05

## 2024-01-03 RX ORDER — LEVOTHYROXINE SODIUM 125 MCG
1 TABLET ORAL
Refills: 0 | DISCHARGE

## 2024-01-03 RX ORDER — VANCOMYCIN HCL 1 G
750 VIAL (EA) INTRAVENOUS EVERY 24 HOURS
Refills: 0 | Status: DISCONTINUED | OUTPATIENT
Start: 2024-01-03 | End: 2024-01-03

## 2024-01-03 RX ORDER — LISINOPRIL/HYDROCHLOROTHIAZIDE 10-12.5 MG
1 TABLET ORAL
Refills: 0 | DISCHARGE

## 2024-01-03 RX ORDER — LISINOPRIL 2.5 MG/1
20 TABLET ORAL EVERY 24 HOURS
Refills: 0 | Status: DISCONTINUED | OUTPATIENT
Start: 2024-01-03 | End: 2024-01-03

## 2024-01-03 RX ORDER — METOPROLOL TARTRATE 50 MG
25 TABLET ORAL DAILY
Refills: 0 | Status: DISCONTINUED | OUTPATIENT
Start: 2024-01-03 | End: 2024-01-05

## 2024-01-03 RX ORDER — ATORVASTATIN CALCIUM 80 MG/1
1 TABLET, FILM COATED ORAL
Refills: 0 | DISCHARGE

## 2024-01-03 RX ORDER — MAGNESIUM SULFATE 500 MG/ML
2 VIAL (ML) INJECTION ONCE
Refills: 0 | Status: COMPLETED | OUTPATIENT
Start: 2024-01-03 | End: 2024-01-03

## 2024-01-03 RX ORDER — CEFTRIAXONE 500 MG/1
1000 INJECTION, POWDER, FOR SOLUTION INTRAMUSCULAR; INTRAVENOUS EVERY 24 HOURS
Refills: 0 | Status: DISCONTINUED | OUTPATIENT
Start: 2024-01-03 | End: 2024-01-03

## 2024-01-03 RX ORDER — LANOLIN ALCOHOL/MO/W.PET/CERES
3 CREAM (GRAM) TOPICAL AT BEDTIME
Refills: 0 | Status: DISCONTINUED | OUTPATIENT
Start: 2024-01-03 | End: 2024-01-05

## 2024-01-03 RX ORDER — CEFTRIAXONE 500 MG/1
2000 INJECTION, POWDER, FOR SOLUTION INTRAMUSCULAR; INTRAVENOUS EVERY 24 HOURS
Refills: 0 | Status: DISCONTINUED | OUTPATIENT
Start: 2024-01-04 | End: 2024-01-05

## 2024-01-03 RX ORDER — METOPROLOL TARTRATE 50 MG
1 TABLET ORAL
Refills: 0 | DISCHARGE

## 2024-01-03 RX ORDER — INFLUENZA VIRUS VACCINE 15; 15; 15; 15 UG/.5ML; UG/.5ML; UG/.5ML; UG/.5ML
0.7 SUSPENSION INTRAMUSCULAR ONCE
Refills: 0 | Status: DISCONTINUED | OUTPATIENT
Start: 2024-01-03 | End: 2024-01-05

## 2024-01-03 RX ADMIN — HEPARIN SODIUM 5000 UNIT(S): 5000 INJECTION INTRAVENOUS; SUBCUTANEOUS at 05:55

## 2024-01-03 RX ADMIN — HEPARIN SODIUM 5000 UNIT(S): 5000 INJECTION INTRAVENOUS; SUBCUTANEOUS at 22:22

## 2024-01-03 RX ADMIN — HEPARIN SODIUM 5000 UNIT(S): 5000 INJECTION INTRAVENOUS; SUBCUTANEOUS at 14:21

## 2024-01-03 RX ADMIN — Medication 20 MILLIEQUIVALENT(S): at 11:48

## 2024-01-03 RX ADMIN — ATORVASTATIN CALCIUM 40 MILLIGRAM(S): 80 TABLET, FILM COATED ORAL at 22:22

## 2024-01-03 RX ADMIN — Medication 650 MILLIGRAM(S): at 17:45

## 2024-01-03 RX ADMIN — Medication 75 MICROGRAM(S): at 05:57

## 2024-01-03 RX ADMIN — Medication 25 MILLIGRAM(S): at 10:24

## 2024-01-03 RX ADMIN — Medication 25 GRAM(S): at 11:48

## 2024-01-03 RX ADMIN — Medication 650 MILLIGRAM(S): at 16:50

## 2024-01-03 NOTE — H&P ADULT - PROBLEM SELECTOR PLAN 7
On admission, presented with /89. On home Lisinopril 20-HCTZ 12.5mg qd and Toprol 25mg  - c/w home Toprol 25mg  - Hold Lisinopril 20-HCTZ 12.5mg qd i/s/o ROHIT

## 2024-01-03 NOTE — H&P ADULT - PROBLEM SELECTOR PLAN 4
Trop I 103. . Likely elevated 2/2 demand ischemia from sepsis. Patient currently denies any chest discomfort, acute SOB, extremity pain.  - Obtain EKG  - Trend toponins till peak Trop I 103. . Likely elevated 2/2 demand ischemia from sepsis. Patient currently denies any chest discomfort, acute SOB, extremity pain. [facility] ED spoke to Cardiology, who evaluated EKG and stated likely 2/2 demand ischemia.  - Obtain EKG  - Trend toponins till peak  - Cardiology consult Trop I 103. . Likely elevated 2/2 demand ischemia from sepsis. Patient currently denies any chest discomfort, acute SOB, extremity pain. [facility] ED spoke to Cardiology, who evaluated EKG and stated likely 2/2 demand ischemia.  - Obtain EKG  - Trend toponins till peak  - Cardiology consult On admission Cr: 1.87 (unknown baseline). Likely 2/2 prerenal 2/2 hypovolemia.   - Obtain bladders can to rule out obstruction  - Strict I/Os  - Continue to trend Cr  - Avoid nephrotoxic agents  - Adjust medication dosages for level of renal function

## 2024-01-03 NOTE — H&P ADULT - PROBLEM SELECTOR PLAN 1
On admission met 3/4 SIRS criteria with 101F, , WBC 15. Likely 2/2 SARS COVID vs. str    - Lactate  - Abx **  - CXR **  - f/u blood cxs, UA/Ucx  - s/p ** IVF in ED, meets 30cc/kg criteria  - Reperfusion assessment completed On admission met 3/4 SIRS criteria with 101F, , WBC 15. Likely 2/2 non-COVID SARS vs less likely UTI. Lactate 3.1, repeat 1.7. CXR unremarkable. U/A negative. S/p Vanc 1g, CTX 1g, 2L NS.  - f/u BCx, UCx  - C/w CTX 1g qd   - Reperfusion assessment completed On admission met 3/4 SIRS criteria with 101F, , WBC 15. Likely 2/2 non-COVID SARS vs aspiration PNA vs. less likely UTI. Lactate 3.1, repeat 1.7. CXR unremarkable. U/A negative. S/p Vanc 1g, CTX 1g, 2L NS.  - f/u BCx, UCx  - C/w CTX 2g qd   - Reperfusion assessment completed

## 2024-01-03 NOTE — H&P ADULT - PROBLEM SELECTOR PLAN 2
Patient became acutely altered in the car today while driving from [facility] to [facility]. Eyes rolled back. No fecal or urinary incontinence. No history of seizures. Remote history of CVA. Likely 2/2 sepsis from non-COVID SARs vs. HTN. [facility]   - Patient is currently AAOx3 without focal deficits, back at baseline  - Perform bedside dysphagia  - PT consult  - Remaining as above Patient became acutely altered in the car today while driving from [facility] to [facility]. Eyes rolled back. No fecal or urinary incontinence. No history of seizures. Remote history of CVA. Likely 2/2 sepsis from non-COVID SARs vs. HTN. [facility]   - Patient is currently AAOx3 without focal deficits, back at baseline  - Perform bedside dysphagia  - PT consult  - Remaining as above Patient became acutely altered in the car today while driving from [facility] to [facility]. Eyes rolled back. No fecal or urinary incontinence. No history of seizures. Remote history of CVA. Likely 2/2 sepsis from non-COVID SARs vs. HTN. [facility]   - Patient is currently AAOx3 without focal deficits, back at baseline  - Perform bedside dysphagia  - Obtain TSH  - PT consult  - Remaining as above Patient became acutely altered in the car today while driving from [facility] to [facility]. Eyes rolled back. No fecal or urinary incontinence. No history of seizures. Remote history of CVA. Likely 2/2 sepsis from non-COVID SARs vs. HTN. [facility]   - Patient is currently AAOx3 without focal deficits, back at baseline  - Perform bedside dysphagia  - Obtain TSH  - PT consult  - Remaining as above Patient became acutely altered in the car today while driving from [facility] to [facility]. Eyes rolled back. No fecal or urinary incontinence. No history of seizures. Remote history of CVA. Likely 2/2 TIA vs sepsis from non-COVID SARs vs. HTN. [facility]   - Patient is currently AAOx3 without focal deficits, back at baseline  - Perform bedside dysphagia  - Obtain TSH, Utox  - PT consult  - fall precautions  - Obtain TTE  - If patient becomes altered again, consider vEEG placement to rule out seizure.  - Remaining as above Patient became acutely altered in the car today while driving from [facility] to [facility]. Eyes rolled back. No fecal or urinary incontinence. No history of seizures. Remote history of CVA. Likely 2/2 TIA vs sepsis from non-COVID SARs vs. HTN. [facility].   - Patient is currently AAOx3 without focal deficits, back at baseline  - Perform bedside dysphagia  - Obtain TSH, Utox  - PT consult  - fall precautions  - Obtain TTE  - If patient becomes altered again, consider vEEG placement to rule out seizure.  - Remaining as above

## 2024-01-03 NOTE — PATIENT PROFILE ADULT - FUNCTIONAL ASSESSMENT - BASIC MOBILITY 6.
3-calculated by average/Not able to assess (calculate score using [facility] averaging method)  3-calculated by average/Not able to assess (calculate score using [facility] averaging method)

## 2024-01-03 NOTE — PROGRESS NOTE ADULT - PROBLEM SELECTOR PLAN 4
Trop I 103. . Likely elevated 2/2 demand ischemia from sepsis. Patient currently denies any chest discomfort, acute SOB, extremity pain. [facility] ED spoke to Cardiology, who evaluated EKG and stated likely 2/2 demand ischemia.  - Obtain EKG  - Trend toponins till peak  - Cardiology consult Trop I 103. . Likely elevated 2/2 demand ischemia from sepsis. Patient currently denies any chest discomfort, acute SOB, extremity pain. [facility] ED spoke to Cardiology, who evaluated EKG and stated likely 2/2 demand ischemia.  - Obtain EKG  - Trend toponins till peak  - Cardiology consult Trop I 103. . Likely elevated 2/2 demand ischemia from sepsis. Patient currently denies any chest discomfort, acute SOB, extremity pain. [facility] ED spoke to Cardiology, who evaluated EKG and stated likely 2/2 demand ischemia.  ECG showed LLLB age not determined  Trop: 18 -> 105 -> 74    - Obtain EKG  - Cardiology consult Trop I 103. . Likely elevated 2/2 demand ischemia from sepsis. Patient currently denies any chest discomfort, acute SOB, extremity pain. [facility] ED spoke to Cardiology, who evaluated EKG and stated likely 2/2 demand ischemia.  ECG showed LLLB age not determined  Trop: 18 -> 105 -> 74    - Obtain EKG  - Cardiology consult

## 2024-01-03 NOTE — PROGRESS NOTE ADULT - PROBLEM SELECTOR PLAN 1
On admission met 3/4 SIRS criteria with 101F, , WBC 15. Likely 2/2 non-COVID SARS vs aspiration PNA vs. less likely UTI. Lactate 3.1, repeat 1.7. CXR unremarkable. U/A negative. S/p Vanc 1g, CTX 1g, 2L NS.  - f/u BCx, UCx  - C/w CTX 2g qd   - Reperfusion assessment completed On admission met 3/4 SIRS criteria with 101F, , WBC 15. Likely 2/2 non-COVID SARS vs aspiration PNA vs. less likely UTI. Lactate 3.1, repeat 1.7. CXR unremarkable. U/A negative. S/p Vanc 1g, CTX 1g, 2L NS.    - f/u BCx, UCx  - C/w CTX 2g qd   - Reperfusion assessment completed

## 2024-01-03 NOTE — H&P ADULT - PROBLEM SELECTOR PLAN 3
On admission Cr: 1.87 (unknown baseline). Likely 2/2 prerenal 2/2 hypovolemia.   - Obtain bladders can to rule out obstruction  - Strict I/Os  - Continue to trend Cr  - Avoid nephrotoxic agents  - Adjust medication dosages for level of renal function Patient became acutely altered in the car today while driving from [facility] to [facility]. Eyes rolled back. No fecal or urinary incontinence. No history of seizures. Remote history of CVA. Likely 2/2 TIA vs sepsis from non-COVID SARs vs. HTN. [facility]   - Patient is currently AAOx3 without focal deficits, back at baseline  - Perform bedside dysphagia  - Obtain TSH, Utox  - PT consult  - fall precautions  - Obtain TTE  - If patient becomes altered again, consider vEEG placement to rule out seizure.  - Remaining as above Patient became acutely altered in the car today while driving from [facility] to [facility]. Eyes rolled back. No fecal or urinary incontinence. No history of seizures. Remote history of CVA. Likely 2/2 TIA vs sepsis from non-COVID SARs vs. HTN. [facility]   - Patient is currently AAOx3 without focal deficits, back at baseline  - Perform bedside dysphagia  - Obtain TSH, Utox  - PT consult  - fall precautions  - Obtain TTE  - If patient becomes altered again, consider vEEG placement to rule out seizure.  - Remaining as above

## 2024-01-03 NOTE — H&P ADULT - NSHPPHYSICALEXAM_GEN_ALL_CORE
T(C): 36.4 (01-03-24 @ 02:36), Max: 38.3 (01-02-24 @ 17:24)  HR: 85 (01-03-24 @ 02:36) (66 - 130)  BP: 125/67 (01-03-24 @ 02:36) (82/52 - 195/89)  RR: 17 (01-03-24 @ 02:36) (15 - 17)  SpO2: 92% (01-03-24 @ 02:36) (92% - 96%)    General: NAD, laying in bed, speaking in full sentences  HEENT: head NC/AT, no conjunctival injection, EOMI, MMM  Neck: supple, no JVD  Cardio: RRR, +S1/S2, no M/R/G  Resp: lungs CTAB, no cough/wheezes/rales/rhonchi  Abdo: soft, NT, ND, +bowel sounds x4, no organomegaly or palpable mass    Extremities: WWP, no edema/cyanosis/clubbing   Vasc: 2+ radial and DP pulses b/l  Neuro: A&Ox3  Psych: speech non-pressured, thoughts goal-oriented  Skin: dry, intact, no visible jaundice   MSK: no joint swelling T(C): 36.4 (01-03-24 @ 02:36), Max: 38.3 (01-02-24 @ 17:24)  HR: 85 (01-03-24 @ 02:36) (66 - 130)  BP: 125/67 (01-03-24 @ 02:36) (82/52 - 195/89)  RR: 17 (01-03-24 @ 02:36) (15 - 17)  SpO2: 92% (01-03-24 @ 02:36) (92% - 96%)    General: NAD, laying in bed, speaking in full sentences  HEENT: head NC/AT, no conjunctival injection, pupils equal and reactive, EOMI, MMM  Neck: supple, no JVD  Cardio: RRR, +S1/S2, no M/R/G  Resp: lungs CTAB, no cough/wheezes/rales/rhonchi, on room air without increase WOB  Abdo: soft, NT, ND, +bowel sounds x4, no organomegaly or palpable mass    Extremities: WWP, no edema/cyanosis/clubbing   Vasc: 2+ radial and DP pulses b/l  Neuro: A&Ox3, no focal deficits, strength 5/5 throughout, sensation and proprioception intact. CN2-12 grossly intact.  Psych: speech non-pressured, thoughts goal-oriented  Skin: dry, intact, no visible jaundice   MSK: no joint swelling

## 2024-01-03 NOTE — PROGRESS NOTE ADULT - PROBLEM SELECTOR PLAN 3
On admission Cr: 1.87 (unknown baseline). Likely 2/2 prerenal 2/2 hypovolemia.   - Obtain bladders can to rule out obstruction  - Strict I/Os  - Continue to trend Cr  - Avoid nephrotoxic agents  - Adjust medication dosages for level of renal function On admission Cr: 1.87 (unknown baseline). Likely 2/2 prerenal 2/2 hypovolemia.   - Bladder scan q6h to rule out obstruction  - Strict I/Os  - Continue to trend Cr  - Avoid nephrotoxic agents  - Adjust medication dosages for level of renal function On admission Cr: 1.87 (unknown baseline). Likely 2/2 prerenal 2/2 hypovolemia.     - Bladder scan q6h to rule out obstruction  - Strict I/Os  - Continue to trend Cr  - Avoid nephrotoxic agents  - Adjust medication dosages for level of renal function

## 2024-01-03 NOTE — H&P ADULT - NSHPSOCIALHISTORY_GEN_ALL_CORE
Lives with daughter. Ambulates without assistance. Ex-smoker, quit 20 years ago. Denies other toxic habits.

## 2024-01-03 NOTE — H&P ADULT - ATTENDING COMMENTS
Patient was seen and examined at bedside on 1/3/2024 at 1230 pm with sons at bedside. She has no acute complaints, feels dramatically improved. Denies SOB, CP. ROS is otherwise negative. Vitals, labwork and pertinent imaging reviewed. Physical exam - NAD, AAO x 4, PERRLA, EOMI, supple neck, chest - decreased BS at L base, CV - rrr, s1s2, no m/r/g, abd - soft, + BS, NTND, ext - wwp, psych - normal affect, skin - no rash    Plan  -Suspect encephalopathy 2/2 dehydration/hypotension but will rule out other causes as well  -C/w  CTX  -F/u cultures  -Repeat CXR [place]  -Cardiology  -EEG  -MRSA swab  -legionella, strep pneumonia  -PT/OT

## 2024-01-03 NOTE — PROGRESS NOTE ADULT - PROBLEM SELECTOR PLAN 9
Patient presented with an acute LOC with moaning and difficulty speaking as mentioned above  - Obtain TTE  - Obtain orthostatics  - Rest as above Patient presented with an acute LOC with moaning and difficulty speaking as mentioned above  Echo: The left ventricle is normal in size, wall thickness, and systolic function with a calculated ejection fraction of 60-65%  Orthostics negative     - Rest as above

## 2024-01-03 NOTE — H&P ADULT - HISTORY OF PRESENT ILLNESS
89F w/ PMHx of HTN, HLD, "minor" CVA 15 years ago, hypothyroidism after thyroidectomy, presents to [facility] for AMS.    ED Course:  Vitals: 101F, , /89, RR 16(94%) on Room air  Imaging: CTH - no acute hemorrhage, mod chronic microvascular ischemic changes. CT perfusion normal  Consults: None  Interventions: CTX 1g, Vancomycin 1g, Tylenol 1g, 2L NS, Ibuprofen 800mg    89F w/ PMHx of HTN, HLD, "minor" CVA 15 years ago, hypothyroidism after thyroidectomy, presents to [facility] for AMS.    ED Course:  Vitals: 101F, , /89, RR 16(94%) on Room air  Imaging: CTH - no acute hemorrhage, mod chronic microvascular ischemic changes. CT perfusion normal  Consults: None  Interventions: CTX 1g, Vancomycin 1g, Tylenol 1g, 2L NS, Ibuprofen 800mg    89F w/ PMHx of HTN, HLD, "minor" CVA 15 years ago, hypothyroidism after thyroidectomy, presents to [facility] for AMS. Daughter (Nora) at bedside to assist with collateral. Patient was planning to go to [place] today to stay with son for a few days.    ED Course:  Vitals: 101F, , /89, RR 16(94%) on Room air  Imaging: CTH - no acute hemorrhage, mod chronic microvascular ischemic changes. CT perfusion normal  Consults: None  Interventions: CTX 1g, Vancomycin 1g, Tylenol 1g, 2L NS, Ibuprofen 800mg    89F w/ PMHx of HTN, HLD, "minor" CVA 15 years ago, hypothyroidism after thyroidectomy, presents to [facility] for AMS. Daughter (Nora) at bedside to assist with collateral. Patient was planning to go to [place] today to stay with son for a few days.    ED Course:  Vitals: 101F, , /89, RR 16(94%) on Room air  Imaging: CTH - no acute hemorrhage, mod chronic microvascular ischemic changes. CT perfusion normal  Consults: None  Interventions: CTX 1g, Vancomycin 1g, Tylenol 1g, 2L NS, Ibuprofen 800mg    89F w/ PMHx of HTN, HLD, "minor" CVA 15 years ago, hypothyroidism after thyroidectomy, presents to [facility] for AMS. Daughter (Nora) at bedside to assist with collateral. Patient was planning to go to [place] today to stay with son for a few days. On the car ride over to [place] to  a family member, patient rolled her eyes, started moaning and inability to speak. They brought her to [facility] and she was found to have a BP 60/40 in triage with inability to follow commands. A stroke code was called, which was unremarkable for an acute event. Patient was given empiric antibiotics and IVF. Soon after, patient returned to her baseline mentation. Per daughter, patient does not have history of seizure or cardiac history. Prior to the incident, patient     ED Course:  Vitals: 101F, , /89, RR 16(94%) on Room air  Imaging: CTH - no acute hemorrhage, mod chronic microvascular ischemic changes. CT perfusion normal  Consults: None  Interventions: CTX 1g, Vancomycin 1g, Tylenol 1g, 2L NS, Ibuprofen 800mg    89F w/ PMHx of HTN, HLD, "minor" CVA 15 years ago, hypothyroidism after thyroidectomy, presents to [facility] for AMS. Daughter (Nora) at bedside to assist with collateral. Patient was planning to go to [place] today to stay with son for a few days. On the car ride over to [place] to  a family member, patient rolled her eyes, started moaning and inability to speak. They brought her to [facility] and she was found to have a BP 60/40 in triage with inability to follow commands. A stroke code was called, which was unremarkable for an acute event. Patient was given empiric antibiotics and IVF. Soon after, patient returned to her baseline mentation. Per daughter, patient does not have history of seizure or cardiac history. Prior to the incident, patient     ED Course:  Vitals: 101F, , /89, RR 16(94%) on Room air  Imaging: CTH - no acute hemorrhage, mod chronic microvascular ischemic changes. CT perfusion normal  Consults: None  Interventions: CTX 1g, Vancomycin 1g, Tylenol 1g, 2L NS, Ibuprofen 800mg    89-year-old Pashto speaking female w/ a PMHx of HTN, HLD, "minor" CVA 15 years ago, hypothyroidism after thyroidectomy, presents to [facility] for AMS. Daughter (Nora) at bedside to assist with collateral. Patient was planning to go to [place] today to stay with son for a few days. On the car ride over to [place] to  a family member, patient rolled her eyes, started moaning and inability to speak. They brought her to [facility] and she was found to have a BP 60/40 in triage with inability to follow commands. A stroke code was called, which was unremarkable for an acute event. Patient was given empiric antibiotics and IVF. Soon after, patient returned to her baseline mentation. Per daughter, patient does not have history of seizure or cardiac history. Prior to the incident, patient endorsed feeling in her usual state of health.Patient denies any recent sick contacts, fevers, chills, nausea, vomiting, headache, acute sob, chest pain, abdominal pain, genitourinary sx, extremity pain or swelling.     ED Course:  Vitals: 101F, , /89, RR 16(94%) on Room air  Imaging: CTH - no acute hemorrhage, mod chronic microvascular ischemic changes. CT perfusion normal  Consults: None  Interventions: CTX 1g, Vancomycin 1g, Tylenol 1g, 2L NS, Ibuprofen 800mg    89-year-old Vietnamese speaking female w/ a PMHx of HTN, HLD, "minor" CVA 15 years ago, hypothyroidism after thyroidectomy, presents to [facility] for AMS. Daughter (Nora) at bedside to assist with collateral. Patient was planning to go to [place] today to stay with son for a few days. On the car ride over to [place] to  a family member, patient rolled her eyes, started moaning and inability to speak. They brought her to [facility] and she was found to have a BP 60/40 in triage with inability to follow commands. A stroke code was called, which was unremarkable for an acute event. Patient was given empiric antibiotics and IVF. Soon after, patient returned to her baseline mentation. Per daughter, patient does not have history of seizure or cardiac history. Prior to the incident, patient endorsed feeling in her usual state of health.Patient denies any recent sick contacts, fevers, chills, nausea, vomiting, headache, acute sob, chest pain, abdominal pain, genitourinary sx, extremity pain or swelling.     ED Course:  Vitals: 101F, , /89, RR 16(94%) on Room air  Imaging: CTH - no acute hemorrhage, mod chronic microvascular ischemic changes. CT perfusion normal  Consults: None  Interventions: CTX 1g, Vancomycin 1g, Tylenol 1g, 2L NS, Ibuprofen 800mg    89-year-old Indonesian speaking female w/ a PMHx of HTN, HLD, "minor" CVA 15 years ago, hypothyroidism after thyroidectomy, presents to [facility] for AMS. Daughter (Nora) at bedside to assist with collateral. Patient was planning to go to [place] today to stay with son for a few days. On the car ride over to [place] to  a family member, patient rolled her eyes, started moaning and inability to speak. They brought her to [facility] and she was found to have a BP 60/40 in triage with inability to follow commands. A stroke code was called, which was unremarkable for an acute event. Patient was given empiric antibiotics and IVF. Soon after, patient returned to her baseline mentation. Per daughter, patient does not have history of seizure or cardiac history. Prior to the incident, patient endorsed feeling in her usual state of health. Patient denies any recent sick contacts, fevers, chills, nausea, vomiting, headache, acute sob, chest pain, abdominal pain, genitourinary sx, extremity pain or swelling.     ED Course:  Vitals: 101F, , /89, RR 16(94%) on Room air  Imaging: CTH - no acute hemorrhage, mod chronic microvascular ischemic changes. CT perfusion normal  Consults: None  Interventions: CTX 1g, Vancomycin 1g, Tylenol 1g, 2L NS, Ibuprofen 800mg    89-year-old Pashto speaking female w/ a PMHx of HTN, HLD, "minor" CVA 15 years ago, hypothyroidism after thyroidectomy, presents to [facility] for AMS. Daughter (Nora) at bedside to assist with collateral. Patient was planning to go to [place] today to stay with son for a few days. On the car ride over to [place] to  a family member, patient rolled her eyes, started moaning and inability to speak. They brought her to [facility] and she was found to have a BP 60/40 in triage with inability to follow commands. A stroke code was called, which was unremarkable for an acute event. Patient was given empiric antibiotics and IVF. Soon after, patient returned to her baseline mentation. Per daughter, patient does not have history of seizure or cardiac history. Prior to the incident, patient endorsed feeling in her usual state of health. Patient denies any recent sick contacts, fevers, chills, nausea, vomiting, headache, acute sob, chest pain, abdominal pain, genitourinary sx, extremity pain or swelling.     ED Course:  Vitals: 101F, , /89, RR 16(94%) on Room air  Imaging: CTH - no acute hemorrhage, mod chronic microvascular ischemic changes. CT perfusion normal  Consults: None  Interventions: CTX 1g, Vancomycin 1g, Tylenol 1g, 2L NS, Ibuprofen 800mg

## 2024-01-03 NOTE — H&P ADULT - NSHPLABSRESULTS_GEN_ALL_CORE
pt 8 weeks pregnant co vaginal bleeding
LABS:                        13.4   15.32 )-----------( 414      ( 02 Jan 2024 13:58 )             41.4     01-02    138  |  100  |  26<H>  ----------------------------<  139<H>  3.4<L>   |  27  |  1.87<H>    Ca    9.1      02 Jan 2024 13:58  Mg     2.0     01-02    TPro  7.3  /  Alb  3.5  /  TBili  0.6  /  DBili  x   /  AST  47<H>  /  ALT  38  /  AlkPhos  62  01-02    PT/INR - ( 02 Jan 2024 13:58 )   PT: 11.6 sec;   INR: 1.06          PTT - ( 02 Jan 2024 13:58 )  PTT:26.4 sec    RADIOLOGY & ADDITIONAL TESTS: Reviewed.

## 2024-01-03 NOTE — H&P ADULT - NSHPPOAPULMEMBOLUS_GEN_A_CORE
[address]  [id number]                    EEG STUDY REPORT              
_______________________________________________________________________________
 
Name:       OLAMIDE RADER              Room:           [address] [place]  
.R.#:  [id number]      Account #:      [id number]  
Admission:  10/22/19     Attend Phys:    ENEDINA Rodriguez
Discharge:  10/25/19     Date of Birth:  [birth year]  
         Report #: [id number]
                                                                     [id number]  
_______________________________________________________________________________
THIS REPORT FOR:  //name//                      
 
CC: Butch Kim
 
DATE OF SERVICE:  10/24/Paul Medina MD         
11/01/19     1424
D: 10/24/19 1742_______________________________________
T: 10/24/19 1809Paul Medina MD            /nt no

## 2024-01-03 NOTE — H&P ADULT - PROBLEM SELECTOR PLAN 6
Remote hx of CVA 15 years ago. On Lipitor 40mg Stroke code negative for acute event in ED  - Stroke consult in AM, appreciate recs  - C/w Lipitor 40mg

## 2024-01-03 NOTE — PROGRESS NOTE ADULT - PROBLEM SELECTOR PLAN 2
Patient became acutely altered in the car today while driving from [facility] to [facility]. Eyes rolled back. No fecal or urinary incontinence. No history of seizures. Remote history of CVA. Likely 2/2 TIA vs sepsis from non-COVID SARs vs. HTN. [facility].   - Patient is currently AAOx3 without focal deficits, back at baseline  - Perform bedside dysphagia  - Obtain TSH, Utox  - PT consult  - fall precautions  - Obtain TTE  - If patient becomes altered again, consider vEEG placement to rule out seizure.  - Remaining as above Patient became acutely altered in the car today while driving from [facility] to [facility]. Eyes rolled back. No fecal or urinary incontinence. No history of seizures. Remote history of CVA. Likely 2/2 TIA vs sepsis from non-COVID SARs vs. HTN. [facility]   - Patient is currently AAOx3 without focal deficits, back at baseline  - Perform bedside dysphagia  - Obtain TSH, Utox  - PT consult  - fall precautions  - Obtain TTE  - If patient becomes altered again, consider vEEG placement to rule out seizure.  - Remaining as above Patient became acutely altered in the car today while driving from [facility] to [facility]. Eyes rolled back. No fecal or urinary incontinence. No history of seizures. Remote history of CVA. Likely 2/2 TIA vs sepsis from non-COVID SARs vs. HTN. [facility].   Patient is currently AAOx3 without focal deficits, back at baseline  Utox (-), TSH 1.92 (on synthoid at home)    - PT consult  - fall precautions  - Obtain TTE  - If patient becomes altered again, consider vEEG placement to rule out seizure.  - Remaining as above Patient became acutely altered in the car today while driving from [facility] to [facility]. Eyes rolled back. No fecal or urinary incontinence. No history of seizures. Remote history of CVA. Likely 2/2 TIA vs sepsis from non-COVID SARs vs. HTN. [facility].   Patient is currently AAOx3 without focal deficits, back at baseline  Utox (-), TSH 1.92 (on synthoid at home)    - PT consult  - fall precautions  - Obtain TTE  - If patient becomes altered again, consider vEEG placement to rule out seizure.  - Remaining as above Patient became acutely altered in the car today while driving from [facility] to [facility]. Eyes rolled back. No fecal or urinary incontinence. No history of seizures. Remote history of CVA. Likely 2/2 TIA vs sepsis from non-COVID SARs vs. HTN. [facility].   Patient is currently AAOx3 without focal deficits, back at baseline  Utox (-), TSH 1.92 (on synthoid at home)  Echo: The left ventricle is normal in size, wall thickness, and systolic function with a calculated ejection fraction of 60-65%    - PT consulted  - fall precautions  - f/u vEEG placement to rule out seizure.  - Remaining as above Patient became acutely altered in the car today while driving from [facility] to [facility]. Eyes rolled back. No fecal or urinary incontinence. No history of seizures. Remote history of CVA. Likely 2/2 TIA vs sepsis from non-COVID SARs vs. HTN. [facility].   Patient is currently AAOx3 without focal deficits, back at baseline  Utox (-), TSH 1.92 (on synthoid at home)  Echo: The left ventricle is normal in size, wall thickness, and systolic function with a calculated ejection fraction of 60-65%    - PT consulted  - fall precautions  - f/u vEEG placement to rule out seizure.  - Remaining as above

## 2024-01-03 NOTE — H&P ADULT - ASSESSMENT
89F w/ PMHx of HTN, HLD, "minor" CVA 15 years ago, hypothyroidism after thyroidectomy, presents to [facility] for AMS. 89F w/ PMHx of HTN, HLD, "minor" CVA 15 years ago, hypothyroidism after thyroidectomy, presents to [facility] for AMS.

## 2024-01-03 NOTE — H&P ADULT - PROBLEM SELECTOR PLAN 8
Plan:  F: s/p 2L NS in the ED   E: replete K<4, Mg<2  N: DASH/TLC  VTE Prophylaxis: Heparin SubQ  C: Full Code  D: ALEXIS

## 2024-01-03 NOTE — PROGRESS NOTE ADULT - ASSESSMENT
89F w/ PMHx of HTN, HLD, "minor" CVA 15 years ago, hypothyroidism after thyroidectomy, presents to [facility] for AMS. 89F w/ PMHx of HTN, HLD, "minor" CVA 15 years ago, hypothyroidism after thyroidectomy, presents to [facility] for AMS.

## 2024-01-03 NOTE — H&P ADULT - NSICDXPASTMEDICALHX_GEN_ALL_CORE_FT
PAST MEDICAL HISTORY:  CVA (cerebrovascular accident)     HTN (hypertension)     Hypothyroidism

## 2024-01-03 NOTE — H&P ADULT - PROBLEM SELECTOR PLAN 9
Patient presented with an acute LOC with moaning and difficulty speaking as mentioned above  - Obtain TTE  - Obtain orthostatics  - Rest as above

## 2024-01-03 NOTE — PROGRESS NOTE ADULT - SUBJECTIVE AND OBJECTIVE BOX
O/N Events:    Subjective/ROS: Patient seen and examined at bedside.     Denies Fever/Chills, HA, CP, SOB, n/v, changes in bowel/urinary habits.  12pt ROS otherwise negative.    VITALS  Vital Signs Last 24 Hrs  T(C): 36.5 (2024 05:46), Max: 38.3 (2024 17:24)  T(F): 97.7 (2024 05:46), Max: 101 (2024 17:24)  HR: 91 (2024 05:46) (66 - 130)  BP: 110/68 (2024 05:46) (82/52 - 195/89)  BP(mean): 86 (2024 02:36) (86 - 113)  RR: 18 (2024 05:46) (15 - 18)  SpO2: 95% (2024 05:46) (92% - 96%)    Parameters below as of 2024 05:46  Patient On (Oxygen Delivery Method): room air        CAPILLARY BLOOD GLUCOSE      POCT Blood Glucose.: 131 mg/dL (2024 13:55)      PHYSICAL EXAM  General: NAD  Head: NC/AT; MMM; PERRL; EOMI;  Neck: Supple; no JVD  Respiratory: CTAB; no wheezes/rales/rhonchi  Cardiovascular: Regular rhythm/rate; S1/S2+, no murmurs, rubs gallops   Gastrointestinal: Soft; NTND; bowel sounds normal and present  Extremities: WWP; no edema/cyanosis  Neurological: A&Ox3, CNII-XII grossly intact; no obvious focal deficits    Antimicrobials:  MEDICATIONS  (STANDING):      Standing Medications:  MEDICATIONS  (STANDING):  atorvastatin 40 milliGRAM(s) Oral at bedtime  heparin   Injectable 5000 Unit(s) SubCutaneous every 8 hours  influenza  Vaccine (HIGH DOSE) 0.7 milliLiter(s) IntraMuscular once  levothyroxine 75 MICROGram(s) Oral daily  metoprolol succinate ER 25 milliGRAM(s) Oral daily      PRN Medications:  MEDICATIONS  (PRN):  acetaminophen     Tablet .. 650 milliGRAM(s) Oral every 6 hours PRN Temp greater or equal to 38C (100.4F), Mild Pain (1 - 3)  aluminum hydroxide/magnesium hydroxide/simethicone Suspension 30 milliLiter(s) Oral every 4 hours PRN Dyspepsia  melatonin 3 milliGRAM(s) Oral at bedtime PRN Insomnia  ondansetron Injectable 4 milliGRAM(s) IV Push every 8 hours PRN Nausea and/or Vomiting      Allergy Status Unknown      LABS                        12.3   21.14 )-----------( 316      ( 2024 06:13 )             36.6     01-02    138  |  100  |  26<H>  ----------------------------<  139<H>  3.4<L>   |  27  |  1.87<H>    Ca    9.1      2024 13:58  Mg     2.0     -    TPro  7.3  /  Alb  3.5  /  TBili  0.6  /  DBili  x   /  AST  47<H>  /  ALT  38  /  AlkPhos  62  01-02    PT/INR - ( 2024 06:12 )   PT: 11.4 sec;   INR: 1.00          PTT - ( 2024 06:12 )  PTT:29.9 sec  Urinalysis Basic - ( 2024 13:58 )    Color: Yellow / Appearance: Cloudy / S.037 / pH: x  Gluc: 139 mg/dL / Ketone: Negative mg/dL  / Bili: Negative / Urobili: 1.0 mg/dL   Blood: x / Protein: Trace mg/dL / Nitrite: Negative   Leuk Esterase: Small / RBC: 11 /HPF / WBC 4 /HPF   Sq Epi: x / Non Sq Epi: x / Bacteria: Many /HPF      CARDIAC MARKERS ( 2024 20:07 )  x     / x     / 394 U/L / x     / 2.4 ng/mL          IMAGING/EKG/ETC   O/N Events: NAEON    Subjective/ROS: Patient seen and examined at bedside. Patient is Hungarian speaking. States that she feels well over all, denies ROS, is A&Ox3. Per daughter by bedside she is at her baseline. Daughter states that she was not present for this event, but stated that the patient vomited her breakfast yesterday on herself. Patients sons who were present will be by bedside later    Denies Fever/Chills, HA, CP, SOB, n/v, changes in bowel/urinary habits.  12pt ROS otherwise negative.    VITALS  Vital Signs Last 24 Hrs  T(C): 36.5 (2024 05:46), Max: 38.3 (2024 17:24)  T(F): 97.7 (2024 05:46), Max: 101 (2024 17:24)  HR: 91 (2024 05:46) (66 - 130)  BP: 110/68 (2024 05:46) (82/52 - 195/89)  BP(mean): 86 (2024 02:36) (86 - 113)  RR: 18 (2024 05:46) (15 - 18)  SpO2: 95% (2024 05:46) (92% - 96%)    Parameters below as of 2024 05:46  Patient On (Oxygen Delivery Method): room air        CAPILLARY BLOOD GLUCOSE      POCT Blood Glucose.: 131 mg/dL (2024 13:55)      PHYSICAL EXAM  General: NAD, thin body habitus  Head: NC/AT; MMM; EOMI;  Neck: Supple; no JVD  Respiratory: CTAB; no wheezes/rales/rhonchi  Cardiovascular: Regular rhythm/rate; S1/S2+, no murmurs, rubs gallops   Gastrointestinal: Soft; NTND;  Extremities: WWP; no edema/cyanosis  Neurological: A&Ox3, no obvious focal deficits    Antimicrobials:  MEDICATIONS  (STANDING):      Standing Medications:  MEDICATIONS  (STANDING):  atorvastatin 40 milliGRAM(s) Oral at bedtime  heparin   Injectable 5000 Unit(s) SubCutaneous every 8 hours  influenza  Vaccine (HIGH DOSE) 0.7 milliLiter(s) IntraMuscular once  levothyroxine 75 MICROGram(s) Oral daily  metoprolol succinate ER 25 milliGRAM(s) Oral daily      PRN Medications:  MEDICATIONS  (PRN):  acetaminophen     Tablet .. 650 milliGRAM(s) Oral every 6 hours PRN Temp greater or equal to 38C (100.4F), Mild Pain (1 - 3)  aluminum hydroxide/magnesium hydroxide/simethicone Suspension 30 milliLiter(s) Oral every 4 hours PRN Dyspepsia  melatonin 3 milliGRAM(s) Oral at bedtime PRN Insomnia  ondansetron Injectable 4 milliGRAM(s) IV Push every 8 hours PRN Nausea and/or Vomiting      Allergy Status Unknown      LABS                        12.3   21.14 )-----------( 316      ( 2024 06:13 )             36.6     01-02    138  |  100  |  26<H>  ----------------------------<  139<H>  3.4<L>   |  27  |  1.87<H>    Ca    9.1      2024 13:58  Mg     2.0     01-02    TPro  7.3  /  Alb  3.5  /  TBili  0.6  /  DBili  x   /  AST  47<H>  /  ALT  38  /  AlkPhos  62  01-02    PT/INR - ( 2024 06:12 )   PT: 11.4 sec;   INR: 1.00          PTT - ( 2024 06:12 )  PTT:29.9 sec  Urinalysis Basic - ( 2024 13:58 )    Color: Yellow / Appearance: Cloudy / S.037 / pH: x  Gluc: 139 mg/dL / Ketone: Negative mg/dL  / Bili: Negative / Urobili: 1.0 mg/dL   Blood: x / Protein: Trace mg/dL / Nitrite: Negative   Leuk Esterase: Small / RBC: 11 /HPF / WBC 4 /HPF   Sq Epi: x / Non Sq Epi: x / Bacteria: Many /HPF      CARDIAC MARKERS ( 2024 20:07 )  x     / x     / 394 U/L / x     / 2.4 ng/mL          IMAGING/EKG/ETC   O/N Events: NAEON    Subjective/ROS: Patient seen and examined at bedside. Patient is Hebrew speaking. States that she feels well over all, denies ROS, is A&Ox3. Per daughter by bedside she is at her baseline. Daughter states that she was not present for this event, but stated that the patient vomited her breakfast yesterday on herself. Patients sons who were present will be by bedside later    Denies Fever/Chills, HA, CP, SOB, n/v, changes in bowel/urinary habits.  12pt ROS otherwise negative.    VITALS  Vital Signs Last 24 Hrs  T(C): 36.5 (2024 05:46), Max: 38.3 (2024 17:24)  T(F): 97.7 (2024 05:46), Max: 101 (2024 17:24)  HR: 91 (2024 05:46) (66 - 130)  BP: 110/68 (2024 05:46) (82/52 - 195/89)  BP(mean): 86 (2024 02:36) (86 - 113)  RR: 18 (2024 05:46) (15 - 18)  SpO2: 95% (2024 05:46) (92% - 96%)    Parameters below as of 2024 05:46  Patient On (Oxygen Delivery Method): room air        CAPILLARY BLOOD GLUCOSE      POCT Blood Glucose.: 131 mg/dL (2024 13:55)      PHYSICAL EXAM  General: NAD, thin body habitus  Head: NC/AT; MMM; EOMI;  Neck: Supple; no JVD  Respiratory: CTAB; no wheezes/rales/rhonchi  Cardiovascular: Regular rhythm/rate; S1/S2+, no murmurs, rubs gallops   Gastrointestinal: Soft; NTND;  Extremities: WWP; no edema/cyanosis  Neurological: A&Ox3, no obvious focal deficits    Antimicrobials:  MEDICATIONS  (STANDING):      Standing Medications:  MEDICATIONS  (STANDING):  atorvastatin 40 milliGRAM(s) Oral at bedtime  heparin   Injectable 5000 Unit(s) SubCutaneous every 8 hours  influenza  Vaccine (HIGH DOSE) 0.7 milliLiter(s) IntraMuscular once  levothyroxine 75 MICROGram(s) Oral daily  metoprolol succinate ER 25 milliGRAM(s) Oral daily      PRN Medications:  MEDICATIONS  (PRN):  acetaminophen     Tablet .. 650 milliGRAM(s) Oral every 6 hours PRN Temp greater or equal to 38C (100.4F), Mild Pain (1 - 3)  aluminum hydroxide/magnesium hydroxide/simethicone Suspension 30 milliLiter(s) Oral every 4 hours PRN Dyspepsia  melatonin 3 milliGRAM(s) Oral at bedtime PRN Insomnia  ondansetron Injectable 4 milliGRAM(s) IV Push every 8 hours PRN Nausea and/or Vomiting      Allergy Status Unknown      LABS                        12.3   21.14 )-----------( 316      ( 2024 06:13 )             36.6     01-02    138  |  100  |  26<H>  ----------------------------<  139<H>  3.4<L>   |  27  |  1.87<H>    Ca    9.1      2024 13:58  Mg     2.0     01-02    TPro  7.3  /  Alb  3.5  /  TBili  0.6  /  DBili  x   /  AST  47<H>  /  ALT  38  /  AlkPhos  62  01-02    PT/INR - ( 2024 06:12 )   PT: 11.4 sec;   INR: 1.00          PTT - ( 2024 06:12 )  PTT:29.9 sec  Urinalysis Basic - ( 2024 13:58 )    Color: Yellow / Appearance: Cloudy / S.037 / pH: x  Gluc: 139 mg/dL / Ketone: Negative mg/dL  / Bili: Negative / Urobili: 1.0 mg/dL   Blood: x / Protein: Trace mg/dL / Nitrite: Negative   Leuk Esterase: Small / RBC: 11 /HPF / WBC 4 /HPF   Sq Epi: x / Non Sq Epi: x / Bacteria: Many /HPF      CARDIAC MARKERS ( 2024 20:07 )  x     / x     / 394 U/L / x     / 2.4 ng/mL          IMAGING/EKG/ETC

## 2024-01-03 NOTE — PATIENT PROFILE ADULT - FALL HARM RISK - HARM RISK INTERVENTIONS
Assistance with ambulation/Assistance OOB with selected safe patient handling equipment/Communicate Risk of Fall with Harm to all staff/Discuss with provider need for PT consult/Monitor gait and stability/Provide patient with walking aids - walker, cane, crutches/Reinforce activity limits and safety measures with patient and family/Sit up slowly, dangle for a short time, stand at bedside before walking/Tailored Fall Risk Interventions/Visual Cue: Yellow wristband and red socks/Bed in lowest position, wheels locked, appropriate side rails in place/Call bell, personal items and telephone in reach/Instruct patient to call for assistance before getting out of bed or chair/Non-slip footwear when patient is out of bed/[place] to call system/Physically safe environment - no spills, clutter or unnecessary equipment/Purposeful Proactive Rounding/Room/bathroom lighting operational, light cord in reach Assistance with ambulation/Assistance OOB with selected safe patient handling equipment/Communicate Risk of Fall with Harm to all staff/Discuss with provider need for PT consult/Monitor gait and stability/Provide patient with walking aids - walker, cane, crutches/Reinforce activity limits and safety measures with patient and family/Sit up slowly, dangle for a short time, stand at bedside before walking/Tailored Fall Risk Interventions/Visual Cue: Yellow wristband and red socks/Bed in lowest position, wheels locked, appropriate side rails in place/Call bell, personal items and telephone in reach/Instruct patient to call for assistance before getting out of bed or chair/Non-slip footwear when patient is out of bed/[place] to call system/Physically safe environment - no spills, clutter or unnecessary equipment/Purposeful Proactive Rounding/Room/bathroom lighting operational, light cord in reach

## 2024-01-04 DIAGNOSIS — N17.0 ACUTE KIDNEY FAILURE WITH TUBULAR NECROSIS: ICD-10-CM

## 2024-01-04 LAB
ANION GAP SERPL CALC-SCNC: 11 MMOL/L — SIGNIFICANT CHANGE UP (ref 5–17)
ANION GAP SERPL CALC-SCNC: 11 MMOL/L — SIGNIFICANT CHANGE UP (ref 5–17)
BASOPHILS # BLD AUTO: 0.07 K/UL — SIGNIFICANT CHANGE UP (ref 0–0.2)
BASOPHILS # BLD AUTO: 0.07 K/UL — SIGNIFICANT CHANGE UP (ref 0–0.2)
BASOPHILS NFR BLD AUTO: 0.6 % — SIGNIFICANT CHANGE UP (ref 0–2)
BASOPHILS NFR BLD AUTO: 0.6 % — SIGNIFICANT CHANGE UP (ref 0–2)
BUN SERPL-MCNC: 21 MG/DL — SIGNIFICANT CHANGE UP (ref 7–23)
BUN SERPL-MCNC: 21 MG/DL — SIGNIFICANT CHANGE UP (ref 7–23)
CALCIUM SERPL-MCNC: 8.1 MG/DL — LOW (ref 8.4–10.5)
CALCIUM SERPL-MCNC: 8.1 MG/DL — LOW (ref 8.4–10.5)
CHLORIDE SERPL-SCNC: 108 MMOL/L — SIGNIFICANT CHANGE UP (ref 96–108)
CHLORIDE SERPL-SCNC: 108 MMOL/L — SIGNIFICANT CHANGE UP (ref 96–108)
CO2 SERPL-SCNC: 22 MMOL/L — SIGNIFICANT CHANGE UP (ref 22–31)
CO2 SERPL-SCNC: 22 MMOL/L — SIGNIFICANT CHANGE UP (ref 22–31)
CREAT SERPL-MCNC: 1.07 MG/DL — SIGNIFICANT CHANGE UP (ref 0.5–1.3)
CREAT SERPL-MCNC: 1.07 MG/DL — SIGNIFICANT CHANGE UP (ref 0.5–1.3)
EGFR: 50 ML/MIN/1.73M2 — LOW
EGFR: 50 ML/MIN/1.73M2 — LOW
EOSINOPHIL # BLD AUTO: 0.2 K/UL — SIGNIFICANT CHANGE UP (ref 0–0.5)
EOSINOPHIL # BLD AUTO: 0.2 K/UL — SIGNIFICANT CHANGE UP (ref 0–0.5)
EOSINOPHIL NFR BLD AUTO: 1.6 % — SIGNIFICANT CHANGE UP (ref 0–6)
EOSINOPHIL NFR BLD AUTO: 1.6 % — SIGNIFICANT CHANGE UP (ref 0–6)
GLUCOSE SERPL-MCNC: 94 MG/DL — SIGNIFICANT CHANGE UP (ref 70–99)
GLUCOSE SERPL-MCNC: 94 MG/DL — SIGNIFICANT CHANGE UP (ref 70–99)
HCT VFR BLD CALC: 33.5 % — LOW (ref 34.5–45)
HCT VFR BLD CALC: 33.5 % — LOW (ref 34.5–45)
HGB BLD-MCNC: 10.7 G/DL — LOW (ref 11.5–15.5)
HGB BLD-MCNC: 10.7 G/DL — LOW (ref 11.5–15.5)
IMM GRANULOCYTES NFR BLD AUTO: 0.4 % — SIGNIFICANT CHANGE UP (ref 0–0.9)
IMM GRANULOCYTES NFR BLD AUTO: 0.4 % — SIGNIFICANT CHANGE UP (ref 0–0.9)
LYMPHOCYTES # BLD AUTO: 1.99 K/UL — SIGNIFICANT CHANGE UP (ref 1–3.3)
LYMPHOCYTES # BLD AUTO: 1.99 K/UL — SIGNIFICANT CHANGE UP (ref 1–3.3)
LYMPHOCYTES # BLD AUTO: 15.9 % — SIGNIFICANT CHANGE UP (ref 13–44)
LYMPHOCYTES # BLD AUTO: 15.9 % — SIGNIFICANT CHANGE UP (ref 13–44)
MAGNESIUM SERPL-MCNC: 2.4 MG/DL — SIGNIFICANT CHANGE UP (ref 1.6–2.6)
MAGNESIUM SERPL-MCNC: 2.4 MG/DL — SIGNIFICANT CHANGE UP (ref 1.6–2.6)
MCHC RBC-ENTMCNC: 31 PG — SIGNIFICANT CHANGE UP (ref 27–34)
MCHC RBC-ENTMCNC: 31 PG — SIGNIFICANT CHANGE UP (ref 27–34)
MCHC RBC-ENTMCNC: 31.9 GM/DL — LOW (ref 32–36)
MCHC RBC-ENTMCNC: 31.9 GM/DL — LOW (ref 32–36)
MCV RBC AUTO: 97.1 FL — SIGNIFICANT CHANGE UP (ref 80–100)
MCV RBC AUTO: 97.1 FL — SIGNIFICANT CHANGE UP (ref 80–100)
MONOCYTES # BLD AUTO: 1.1 K/UL — HIGH (ref 0–0.9)
MONOCYTES # BLD AUTO: 1.1 K/UL — HIGH (ref 0–0.9)
MONOCYTES NFR BLD AUTO: 8.8 % — SIGNIFICANT CHANGE UP (ref 2–14)
MONOCYTES NFR BLD AUTO: 8.8 % — SIGNIFICANT CHANGE UP (ref 2–14)
NEUTROPHILS # BLD AUTO: 9.14 K/UL — HIGH (ref 1.8–7.4)
NEUTROPHILS # BLD AUTO: 9.14 K/UL — HIGH (ref 1.8–7.4)
NEUTROPHILS NFR BLD AUTO: 72.7 % — SIGNIFICANT CHANGE UP (ref 43–77)
NEUTROPHILS NFR BLD AUTO: 72.7 % — SIGNIFICANT CHANGE UP (ref 43–77)
NRBC # BLD: 0 /100 WBCS — SIGNIFICANT CHANGE UP (ref 0–0)
NRBC # BLD: 0 /100 WBCS — SIGNIFICANT CHANGE UP (ref 0–0)
PHOSPHATE SERPL-MCNC: 2.6 MG/DL — SIGNIFICANT CHANGE UP (ref 2.5–4.5)
PHOSPHATE SERPL-MCNC: 2.6 MG/DL — SIGNIFICANT CHANGE UP (ref 2.5–4.5)
PLATELET # BLD AUTO: 278 K/UL — SIGNIFICANT CHANGE UP (ref 150–400)
PLATELET # BLD AUTO: 278 K/UL — SIGNIFICANT CHANGE UP (ref 150–400)
POTASSIUM SERPL-MCNC: 3.9 MMOL/L — SIGNIFICANT CHANGE UP (ref 3.5–5.3)
POTASSIUM SERPL-MCNC: 3.9 MMOL/L — SIGNIFICANT CHANGE UP (ref 3.5–5.3)
POTASSIUM SERPL-SCNC: 3.9 MMOL/L — SIGNIFICANT CHANGE UP (ref 3.5–5.3)
POTASSIUM SERPL-SCNC: 3.9 MMOL/L — SIGNIFICANT CHANGE UP (ref 3.5–5.3)
PROCALCITONIN SERPL-MCNC: 2.97 NG/ML — HIGH (ref 0.02–0.1)
PROCALCITONIN SERPL-MCNC: 2.97 NG/ML — HIGH (ref 0.02–0.1)
RBC # BLD: 3.45 M/UL — LOW (ref 3.8–5.2)
RBC # BLD: 3.45 M/UL — LOW (ref 3.8–5.2)
RBC # FLD: 14.6 % — HIGH (ref 10.3–14.5)
RBC # FLD: 14.6 % — HIGH (ref 10.3–14.5)
SODIUM SERPL-SCNC: 141 MMOL/L — SIGNIFICANT CHANGE UP (ref 135–145)
SODIUM SERPL-SCNC: 141 MMOL/L — SIGNIFICANT CHANGE UP (ref 135–145)
WBC # BLD: 12.55 K/UL — HIGH (ref 3.8–10.5)
WBC # BLD: 12.55 K/UL — HIGH (ref 3.8–10.5)
WBC # FLD AUTO: 12.55 K/UL — HIGH (ref 3.8–10.5)
WBC # FLD AUTO: 12.55 K/UL — HIGH (ref 3.8–10.5)

## 2024-01-04 PROCEDURE — 99233 SBSQ HOSP IP/OBS HIGH 50: CPT | Mod: GC

## 2024-01-04 PROCEDURE — 95720 EEG PHY/QHP EA INCR W/VEEG: CPT

## 2024-01-04 PROCEDURE — 99221 1ST HOSP IP/OBS SF/LOW 40: CPT

## 2024-01-04 RX ORDER — CEFPODOXIME PROXETIL 100 MG
1 TABLET ORAL
Qty: 8 | Refills: 0
Start: 2024-01-04 | End: 2024-01-07

## 2024-01-04 RX ORDER — SODIUM,POTASSIUM PHOSPHATES 278-250MG
1 POWDER IN PACKET (EA) ORAL ONCE
Refills: 0 | Status: COMPLETED | OUTPATIENT
Start: 2024-01-04 | End: 2024-01-04

## 2024-01-04 RX ORDER — ASPIRIN/CALCIUM CARB/MAGNESIUM 324 MG
1 TABLET ORAL
Refills: 0 | DISCHARGE

## 2024-01-04 RX ADMIN — Medication 75 MICROGRAM(S): at 06:14

## 2024-01-04 RX ADMIN — HEPARIN SODIUM 5000 UNIT(S): 5000 INJECTION INTRAVENOUS; SUBCUTANEOUS at 22:04

## 2024-01-04 RX ADMIN — ATORVASTATIN CALCIUM 40 MILLIGRAM(S): 80 TABLET, FILM COATED ORAL at 22:04

## 2024-01-04 RX ADMIN — HEPARIN SODIUM 5000 UNIT(S): 5000 INJECTION INTRAVENOUS; SUBCUTANEOUS at 14:00

## 2024-01-04 RX ADMIN — Medication 1 PACKET(S): at 10:14

## 2024-01-04 RX ADMIN — Medication 25 MILLIGRAM(S): at 05:48

## 2024-01-04 RX ADMIN — CEFTRIAXONE 100 MILLIGRAM(S): 500 INJECTION, POWDER, FOR SOLUTION INTRAMUSCULAR; INTRAVENOUS at 06:22

## 2024-01-04 RX ADMIN — HEPARIN SODIUM 5000 UNIT(S): 5000 INJECTION INTRAVENOUS; SUBCUTANEOUS at 06:14

## 2024-01-04 NOTE — DISCHARGE NOTE PROVIDER - NSDCMRMEDTOKEN_GEN_ALL_CORE_FT
s/p greenlight prostate aspirin 81 mg oral tablet: 1 tab(s) orally once a day  cefpodoxime 200 mg oral tablet: 1 tab(s) orally every 12 hours  Lipitor 40 mg oral tablet: 1 tab(s) orally once a day (at bedtime)  lisinopril-hydrochlorothiazide 20 mg-12.5 mg oral tablet: 1 tab(s) orally once a day  Synthroid 75 mcg (0.075 mg) oral tablet: 1 tab(s) orally once a day  Toprol-XL 25 mg oral tablet, extended release: 1 tab(s) orally once a day

## 2024-01-04 NOTE — PHYSICAL THERAPY INITIAL EVALUATION ADULT - PERTINENT HX OF CURRENT PROBLEM, REHAB EVAL
89-year-old Frisian speaking female w/ a PMHx of HTN, HLD, "minor" CVA 15 years ago, hypothyroidism after thyroidectomy, presents to [facility] for AMS. Daughter (Nora) at bedside to assist with collateral. Patient was on a car ride with family when patient rolled her eyes, started moaning and inability to speak. They brought her to [facility] and she was found to have a BP 60/40 in triage with inability to follow commands. A stroke code was called, which was unremarkable for an acute event. Patient was given empiric antibiotics and IVF. Soon after, patient returned to her baseline mentation. Per daughter, patient does not have history of seizure or cardiac history. Prior to the incident, patient endorsed feeling in her usual state of health. 89-year-old Divehi speaking female w/ a PMHx of HTN, HLD, "minor" CVA 15 years ago, hypothyroidism after thyroidectomy, presents to [facility] for AMS. Daughter (Nora) at bedside to assist with collateral. Patient was on a car ride with family when patient rolled her eyes, started moaning and inability to speak. They brought her to [facility] and she was found to have a BP 60/40 in triage with inability to follow commands. A stroke code was called, which was unremarkable for an acute event. Patient was given empiric antibiotics and IVF. Soon after, patient returned to her baseline mentation. Per daughter, patient does not have history of seizure or cardiac history. Prior to the incident, patient endorsed feeling in her usual state of health.

## 2024-01-04 NOTE — CONSULT NOTE ADULT - ATTENDING COMMENTS
Patient is an 88 yo F with PMH HTN, HLD, CVA, Carortic Artery Stenosis and hypothyroidism who presented with AMS, found to have sepsis 2/2 no nCOVID coronavirus with aspiration PNA, a/w ROHIT. Cardiology consulted for Syncope Evaluation    Review of studies  - TTE 1/3/24: Septal knuckle without LVOTO. Normal biventricular size/function. G1DD. Mild MR. Mild TR. PASP 40. RA pressure 3  - EKG 1/3/2024: NSR, LBBB    # Syncope  - Patient was in car with Family member as a passenger when she had an episode of AMS. She reports preceding warmth prior to episode after which family reported she loss consciousness and aspirated.  - Upon admission patient noted to be In severe sepsis now with remarked improvement  - ECG on admission reviewed showing NSR with LBBB  - Echo this hospitalization revealed normal biventricular function with a PASP of 40 mmhG  - Contacted patient's outpatient Cardiologist in [place], Dr Damon Bay who reports chronic known LBBB  - Given prodromal symptoms (Feeling of warmth) preceding the episode and Sepsis noted with significant clinical improvement, believe at this time that this episode was not arrhythmogenic in nature.  - Clinically patient is warm, well compensated without any anginal complaints  - Discussed case at length with patients 2 sons. They will ensure she follows up with her Cardiologist upon return to [place]. She will remain in [facility] for 2 weeks prior to traveling. They know to seek medical attention with any repeat episode  - Discuss role of Ambulatory ECG monitor with ILR should episode reccur as an outpatient        History suggests vasovagal vs orthostasis in setting of sepsis, less likely cardiac etiology. Orthostatics negative here but this was after IVF given.  TTE without significant disease as above  -In lieu of tele, obtain daily EKG  -Please contact outpatient cardiologist to inquire about LBBB - is it new?  -If symptoms recur after treatment of infection, she may benefit from outpatient event monitor  -Please ensure follow-up with her outpatient cardiologist in [place] Patient is an 88 yo F with PMH HTN, HLD, CVA, Carortic Artery Stenosis and hypothyroidism who presented with AMS, found to have sepsis 2/2 no nCOVID coronavirus with aspiration PNA, a/w ROHIT. Cardiology consulted for Syncope Evaluation    Review of studies  - TTE 1/3/24: Septal knuckle without LVOTO. Normal biventricular size/function. G1DD. Mild MR. Mild TR. PASP 40. RA pressure 3  - EKG 1/3/2024: NSR, LBBB    # Syncope  - Patient was in car with Family member as a passenger when she had an episode of AMS. She reports preceding warmth prior to episode after which family reported she loss consciousness and aspirated.  - Upon admission patient noted to be In severe sepsis now with remarked improvement  - ECG on admission reviewed showing NSR with LBBB  - Echo this hospitalization revealed normal biventricular function with a PASP of 40 mmhG  - Contacted patient's outpatient Cardiologist in [place], Dr Damon Bay who reports chronic known LBBB  - Given prodromal symptoms (Feeling of warmth) preceding the episode and Sepsis noted with significant clinical improvement, believe at this time that this episode was not arrhythmogenic in nature.  - Clinically patient is warm, well compensated without any anginal complaints  - Discussed case at length with patients 2 sons. They will ensure she follows up with her Cardiologist upon return to [place]. She will remain in [facility] for 2 weeks prior to traveling. They know to seek medical attention with any repeat episode  - Discuss role of Ambulatory ECG monitor with ILR should episode reccur as an outpatient        History suggests vasovagal vs orthostasis in setting of sepsis, less likely cardiac etiology. Orthostatics negative here but this was after IVF given.  TTE without significant disease as above  -In lieu of tele, obtain daily EKG  -Please contact outpatient cardiologist to inquire about LBBB - is it new?  -If symptoms recur after treatment of infection, she may benefit from outpatient event monitor  -Please ensure follow-up with her outpatient cardiologist in [place] Patient is an 89 yo F with PMH HTN, HLD, CVA, Carortic Artery Stenosis and hypothyroidism who presented with AMS, found to have sepsis 2/2 no nCOVID coronavirus with aspiration PNA, a/w ROHIT. Cardiology consulted for Syncope Evaluation    Review of studies  - TTE 1/3/24: Septal knuckle without LVOTO. Normal biventricular size/function. G1DD. Mild MR. Mild TR. PASP 40. RA pressure 3  - EKG 1/3/2024: NSR, LBBB    # Syncope  - Patient was in car with Family member as a passenger when she had an episode of AMS. She reports preceding warmth prior to episode after which family reported she loss consciousness and aspirated.  - Upon admission patient noted to be In severe sepsis now with remarked improvement  - ECG on admission reviewed showing NSR with LBBB  - Echo this hospitalization revealed normal biventricular function with a PASP of 40 mmhG  - Contacted patient's outpatient Cardiologist in [place], Dr Damon Bay who reports chronic known LBBB  - Given prodromal symptoms (Feeling of warmth) preceding the episode and Sepsis noted with significant clinical improvement, believe at this time that this episode was not arrhythmogenic in nature.  - Clinically patient is warm, well compensated without any anginal complaints  - Discussed case at length with patients 2 sons. They will ensure she follows up with her Cardiologist upon return to [place]. She will remain in [facility] for 2 weeks prior to traveling. They know to seek medical attention with any repeat episode  - Discuss role of Ambulatory ECG monitor with ILR should episode reccur as an outpatient Patient is an 88 yo F with PMH HTN, HLD, CVA, Carortic Artery Stenosis and hypothyroidism who presented with AMS, found to have sepsis 2/2 no nCOVID coronavirus with aspiration PNA, a/w RHOIT. Cardiology consulted for Syncope Evaluation    Review of studies  - TTE 1/3/24: Septal knuckle without LVOTO. Normal biventricular size/function. G1DD. Mild MR. Mild TR. PASP 40. RA pressure 3  - EKG 1/3/2024: NSR, LBBB    # Syncope  - Patient was in car with Family member as a passenger when she had an episode of AMS. She reports preceding warmth prior to episode after which family reported she loss consciousness and aspirated.  - Upon admission patient noted to be In severe sepsis now with remarked improvement  - ECG on admission reviewed showing NSR with LBBB  - Echo this hospitalization revealed normal biventricular function with a PASP of 40 mmhG  - Contacted patient's outpatient Cardiologist in [place], Dr Damon Bay who reports chronic known LBBB  - Given prodromal symptoms (Feeling of warmth) preceding the episode and Sepsis noted with significant clinical improvement, believe at this time that this episode was not arrhythmogenic in nature.  - Clinically patient is warm, well compensated without any anginal complaints  - Discussed case at length with patients 2 sons. They will ensure she follows up with her Cardiologist upon return to [place]. She will remain in [facility] for 2 weeks prior to traveling. They know to seek medical attention with any repeat episode  - Discuss role of Ambulatory ECG monitor with ILR should episode reccur as an outpatient

## 2024-01-04 NOTE — DISCHARGE NOTE PROVIDER - ATTENDING DISCHARGE PHYSICAL EXAMINATION:
Patient was seen and examined at bedside on 1/5/2024 at 1230 pm with sons at bedside. She has no acute complaints, feels back to baseline. Denies SOB, CP. ROS is otherwise negative. Vitals, labwork and pertinent imaging reviewed. Physical exam - NAD, AAO x 4, PERRLA, EOMI, supple neck, chest - decreased BS at L base, CV - rrr, s1s2, no m/r/g, abd - soft, + BS, NTND, ext - wwp, psych - normal affect, skin - no rash    Plan  -C/w  CTX transition to oral abx as an outpatient  -F/u cultures  -Repeat CXR Pa/La showing likely LLL infiltrate  -Cardiology consulted - LBBB is known/chronic  -EEG - no reported seizure  -MRSA swab, legionella and strep - negative  -PT/OT  -Patient is medically ready for d/c

## 2024-01-04 NOTE — DISCHARGE NOTE PROVIDER - NSDCCPTREATMENT_GEN_ALL_CORE_FT
PRINCIPAL PROCEDURE  Procedure: ECG performed  Findings and Treatment: An electrocardiogram (EKG or ECG) is a test that checks for problems with the electrical activity of your heart. An EKG shows the heart's electrical activity as line tracings on paper. The spikes and dips in the tracings are called waves.  The heart is a muscular pump made up of four chambers. The two upper chambers are called atria. The two lower chambers are called ventricles. A natural electrical system causes the heart muscle to contract. This pumps blood through the heart to the lungs and the rest of the body.  You were noted to have a left bundle branch block on your ECG. Please follow up with your cardiologist to establish if this a new or old finding.

## 2024-01-04 NOTE — DISCHARGE NOTE PROVIDER - CARE PROVIDER_API CALL
Montse Garcia [facility]  [address] [place] [address].  [place] [address]  Phone: [phone]  Fax: (   )    -  Established Patient  Follow Up Time:     J [address] Esqu[address]  [place] [address]  Phone: [phone]  Fax: (   )    -  Established Patient  Follow Up Time:    Montse Garcia [facility]  [address] [place] [address].  [place] [address]  Phone: [phone]  Fax: (   )    -  Established Patient  Follow Up Time:     J Carlos Esquivel[address]  [place] [address]  Phone: [phone]  Fax: (   )    -  Established Patient  Follow Up Time:

## 2024-01-04 NOTE — EEG REPORT - NS EEG TEXT BOX
[facility] Department of Neurology  Inpatient Continuous video-Electroencephalogram      Patient Name:	PHILLIP PAULA    :	9/10/1934  MRN:	[id number]    Study Start Date/Time:	1/3/2024, 5:02:51 PM  Study End Date/Time: 2024, 10:08 AM    Referred by:  Geraldo Root MD    Brief Clinical History:  PHILLIP PAULA is an 89-year-old woman with altered mental status; study performed to investigate for seizures or markers of epilepsy.     Diagnosis Code:  R40.4 Transient alteration of awareness    Pertinent Medication:  n/a    Acquisition Details:  Electroencephalography was acquired using a minimum of 21 channels on an [employer] system v 9.3.1 with electrode placement according to the standard International 10-20 system following ACNS (American Clinical Neurophysiology Society) guidelines.  Anterior temporal T1 and T2 electrodes were utilized whenever possible.  The XLTEK automated spike & seizure detections were all reviewed in detail, in addition to the entire raw EEG.    Findings:    Background:  continuous, with predominantly alpha/theta frequencies.  Generalized Slowing:  None  Symmetry/Focality: No persistent asymmetries of voltage or frequency.                    Voltage:  Normal (20+ uV)  Organization:  Appropriate anterior-posterior gradient  Posterior Dominant Rhythm:  11 Hz symmetric, well-organized, and well-modulated  Sleep:  Symmetric, synchronous spindles and K complexes.  Variability:   Yes		Reactivity:  Yes    Spontaneous Activity:  No epileptiform discharges                 Events:  1)	No electrographic seizures or significant clinical events occurred during this study.  Provocations:  •	Hyperventilation: was not performed.  •	Photic stimulation: was not performed.    Summary and Clinical Correlation:    1)	Mild generalized background slowing, indicating a mild degree of diffuse or multifocal cerebral dysfunction.  2)	There were no findings of active epilepsy, however this alone does not rule out the diagnosis.         Minnie Mendez MD  Attending Neurologist, [facility] Epilepsy Program     [facility] Department of Neurology  Inpatient Continuous video-Electroencephalogram      Patient Name:	PHILLIP PAULA    :	9/10/1934  MRN:	[id number]    Study Start Date/Time:	1/3/2024, 5:02:51 PM  Study End Date/Time: 2024, 10:08 AM    Referred by:  Geraldo Root MD    Brief Clinical History:  PHILLIP PAULA is an 89-year-old woman with altered mental status; study performed to investigate for seizures or markers of epilepsy.     Diagnosis Code:  R40.4 Transient alteration of awareness    Pertinent Medication:  n/a    Acquisition Details:  Electroencephalography was acquired using a minimum of 21 channels on an [employer] Neu[employer] system v 9.3.1 with electrode placement according to the standard International 10-20 system following ACNS (American Clinical Neurophysiology Society) guidelines.  Anterior temporal T1 and T2 electrodes were utilized whenever possible.  The XLTEK automated spike & seizure detections were all reviewed in detail, in addition to the entire raw EEG.    Findings:    Background:  continuous, with predominantly alpha/theta frequencies.  Generalized Slowing:  None  Symmetry/Focality: No persistent asymmetries of voltage or frequency.                    Voltage:  Normal (20+ uV)  Organization:  Appropriate anterior-posterior gradient  Posterior Dominant Rhythm:  11 Hz symmetric, well-organized, and well-modulated  Sleep:  Symmetric, synchronous spindles and K complexes.  Variability:   Yes		Reactivity:  Yes    Spontaneous Activity:  No epileptiform discharges                 Events:  1)	No electrographic seizures or significant clinical events occurred during this study.  Provocations:  •	Hyperventilation: was not performed.  •	Photic stimulation: was not performed.    Summary and Clinical Correlation:    1)	Mild generalized background slowing, indicating a mild degree of diffuse or multifocal cerebral dysfunction.  2)	There were no findings of active epilepsy, however this alone does not rule out the diagnosis.         Minnie Mendez MD  Attending Neurologist, [facility] Epilepsy Program

## 2024-01-04 NOTE — PROGRESS NOTE ADULT - PROBLEM SELECTOR PLAN 3
On admission Cr: 1.87 (unknown baseline). Likely 2/2 prerenal 2/2 hypovolemia.     - Bladder scan q6h to rule out obstruction  - Strict I/Os  - Continue to trend Cr  - Avoid nephrotoxic agents  - Adjust medication dosages for level of renal function RESOLVED  On admission Cr: 1.87 (unknown baseline). Likely 2/2 prerenal 2/2 hypovolemia.     - Bladder scan q6h to rule out obstruction  - Strict I/Os  - Continue to trend Cr  - Avoid nephrotoxic agents  - Adjust medication dosages for level of renal function resolving  On admission Cr: 1.87 normal baseline). Likely 2/2 prerenal 2/2 hypovolemia.     U/A with casts    - Bladder scan q6h to rule out obstruction  - Strict I/Os  - Continue to trend Cr  - Avoid nephrotoxic agents  - Adjust medication dosages for level of renal function

## 2024-01-04 NOTE — PROGRESS NOTE ADULT - ASSESSMENT
89F w/ PMHx of HTN, HLD, "minor" CVA 15 years ago, hypothyroidism after thyroidectomy, presents to [facility] for AMS. 89F w/ PMHx of HTN, HLD, "minor" CVA 15 years ago, hypothyroidism after thyroidectomy, presents to [facility] for AMS.

## 2024-01-04 NOTE — PHYSICAL THERAPY INITIAL EVALUATION ADULT - PERSONAL SAFETY AND JUDGMENT, REHAB EVAL
Left message for the parent or guardian to call the office   If mom calls back please inform her of Dr Graciela Magallon message intact

## 2024-01-04 NOTE — PHYSICAL THERAPY INITIAL EVALUATION ADULT - ADDITIONAL COMMENTS
Pt lives with family in a condo, 12 steps inside to use shower, previously independent with all ADLs and mobility, no AD.

## 2024-01-04 NOTE — CONSULT NOTE ADULT - ASSESSMENT
I M    89 y o F w/ PMHx of HTN, HLD, "minor" CVA 15 years ago, hypothyroidism after thyroidectomy, presents to [facility] for AMS.     Problem/Plan - 1:  ·  Problem: Severe sepsis.      Problem/Plan - 2:  ·  Problem: Type 2 myocardial infarction.      Problem/Plan - 3:  ·  Problem: Metabolic encephalopathy.  ·  Plan: Patient became acutely altered in the car today while driving [facility] to [facility]. Eyes rolled back. No fecal or urinary incontinence. No history of seizures. Remote history of CVA. Likely 2/2 TIA vs sepsis from non-COVID SARs vs. HTN. [facility]   - Patient is currently AAOx3 without focal deficits, back at baseline  - Perform bedside dysphagia  - Obtain TSH, Utox  - PT consult  - fall precautions  - Obtain TTE  - If patient becomes altered again, consider vEEG placement to rule out seizure.  - Remaining as above.     Problem/Plan - 4:  ·  Problem: ROHIT (acute kidney injury).  ·  Plan: On admission Cr: 1.87 (unknown baseline). Likely 2/2 prerenal 2/2 hypovolemia.   - Obtain bladders can to rule out obstruction  - Strict I/Os  - Continue to trend Cr  - Avoid nephrotoxic agents  - Adjust medication dosages for level of renal function.     Problem/Plan - 5:  ·  Problem: Hypothyroidism.   ·  Plan: - c/w home synthroid 75mcg  - Obtain TSH level.     Problem/Plan - 6:  ·  Problem: CVA (cerebrovascular accident).   ·  Plan: Remote hx of CVA 15 years ago. On Lipitor 40mg Stroke code negative for acute event in ED  - Stroke consult in AM, appreciate recs  - C/w Lipitor 40mg.     Problem/Plan - 7:  ·  Problem: HTN (hypertension).   ·  Plan: On admission, presented with /89. On home Lisinopril 20-HCTZ 12.5mg qd and Toprol 25mg  - c/w home Toprol 25mg  - Hold Lisinopril 20-HCTZ 12.5mg qd i/s/o ROHIT.     Problem/Plan - 8:  ·  Problem: Need for prophylactic measure.   ·  Plan: Plan:  F: s/p 2L NS in the ED   E: replete K<4, Mg<2  N: DASH/TLC  VTE Prophylaxis: Heparin SubQ  C: Full Code  D: RMF.     Problem/Plan - 9:  ·  Problem: R/O Syncope.   ·  Plan: Patient presented with an acute LOC with moaning and difficulty speaking as mentioned above  - Obtain TTE  - Obtain orthostatics  - Rest as above.       Attestation Statements:  I have personally seen and examined the patient.  I fully participated in the care of this patient.  I have made amendments to the documentation where necessary, and agree with the history, physical exam, and plan as documented by the Resident.     Patient was seen and examined at bedside on 1/3/2024 at 1230 pm with sons at bedside. She has no acute complaints, feels dramatically improved. Denies SOB, CP. ROS is otherwise negative. Vitals, labwork and pertinent imaging reviewed. Physical exam - NAD, AAO x 4, PERRLA, EOMI, supple neck, chest - decreased BS at L base, CV - rrr, s1s2, no m/r/g, abd - soft, + BS, NTND, ext - wwp, psych - normal affect, skin - no rash    Plan  -Suspect encephalopathy 2/2 dehydration/hypotension but will rule out other causes as well  -C/w  CTX  -F/u cultures  -Repeat CXR Pa/La  -Cardiology  -EEG  -MRSA swab  -legionella, strep pneumonia  -PT/OT .       I M    89 y o F w/ PMHx of HTN, HLD, "minor" CVA 15 years ago, hypothyroidism after thyroidectomy, presents to [facility] for AMS.     Problem/Plan - 1:  ·  Problem: Severe sepsis.      Problem/Plan - 2:  ·  Problem: Type 2 myocardial infarction.      Problem/Plan - 3:  ·  Problem: Metabolic encephalopathy.  ·  Plan: Patient became acutely altered in the car today while driving from [facility] to [facility]. Eyes rolled back. No fecal or urinary incontinence. No history of seizures. Remote history of CVA. Likely 2/2 TIA vs sepsis from non-COVID SARs vs. HTN. [facility]   - Patient is currently AAOx3 without focal deficits, back at baseline  - Perform bedside dysphagia  - Obtain TSH, Utox  - PT consult  - fall precautions  - Obtain TTE  - If patient becomes altered again, consider vEEG placement to rule out seizure.  - Remaining as above.     Problem/Plan - 4:  ·  Problem: ROHIT (acute kidney injury).  ·  Plan: On admission Cr: 1.87 (unknown baseline). Likely 2/2 prerenal 2/2 hypovolemia.   - Obtain bladders can to rule out obstruction  - Strict I/Os  - Continue to trend Cr  - Avoid nephrotoxic agents  - Adjust medication dosages for level of renal function.     Problem/Plan - 5:  ·  Problem: Hypothyroidism.   ·  Plan: - c/w home synthroid 75mcg  - Obtain TSH level.     Problem/Plan - 6:  ·  Problem: CVA (cerebrovascular accident).   ·  Plan: Remote hx of CVA 15 years ago. On Lipitor 40mg Stroke code negative for acute event in ED  - Stroke consult in AM, appreciate recs  - C/w Lipitor 40mg.     Problem/Plan - 7:  ·  Problem: HTN (hypertension).   ·  Plan: On admission, presented with /89. On home Lisinopril 20-HCTZ 12.5mg qd and Toprol 25mg  - c/w home Toprol 25mg  - Hold Lisinopril 20-HCTZ 12.5mg qd i/s/o ROHIT.     Problem/Plan - 8:  ·  Problem: Need for prophylactic measure.   ·  Plan: Plan:  F: s/p 2L NS in the ED   E: replete K<4, Mg<2  N: DASH/TLC  VTE Prophylaxis: Heparin SubQ  C: Full Code  D: RMF.     Problem/Plan - 9:  ·  Problem: R/O Syncope.   ·  Plan: Patient presented with an acute LOC with moaning and difficulty speaking as mentioned above  - Obtain TTE  - Obtain orthostatics  - Rest as above.       Attestation Statements:  I have personally seen and examined the patient.  I fully participated in the care of this patient.  I have made amendments to the documentation where necessary, and agree with the history, physical exam, and plan as documented by the Resident.     Patient was seen and examined at bedside on 1/3/2024 at 1230 pm with sons at bedside. She has no acute complaints, feels dramatically improved. Denies SOB, CP. ROS is otherwise negative. Vitals, labwork and pertinent imaging reviewed. Physical exam - NAD, AAO x 4, PERRLA, EOMI, supple neck, chest - decreased BS at L base, CV - rrr, s1s2, no m/r/g, abd - soft, + BS, NTND, ext - wwp, psych - normal affect, skin - no rash    Plan  -Suspect encephalopathy 2/2 dehydration/hypotension but will rule out other causes as well  -C/w  CTX  -F/u cultures  -Repeat CXR Pa/La  -Cardiology  -EEG  -MRSA swab  -legionella, strep pneumonia  -PT/OT .

## 2024-01-04 NOTE — PROGRESS NOTE ADULT - PROBLEM SELECTOR PLAN 6
Remote hx of CVA 15 years ago. On Lipitor 40mg Stroke code negative for acute event in ED  - Stroke consult in AM, appreciate recs  - C/w Lipitor 40mg
Remote hx of CVA 15 years ago. On Lipitor 40mg Stroke code negative for acute event in ED  - Stroke consult in AM, appreciate recs  - C/w Lipitor 40mg

## 2024-01-04 NOTE — CONSULT NOTE ADULT - ASSESSMENT
89F PMH HTN, HLD, CVA, hypothyroidism who presented with AMS, found to have sepsis 2/2 nonCOVID coronavirus vs aspiration PNA, a/w ROHIT. Cardiology consulted for possible syncope.    Review of studies  TTE 1/3/23: Septal knuckle without LVOTO. Normal biventricular size/function. G1DD. Mild MR. Mild TR. PASP 40. RA pressure 3 89F PMH HTN, HLD, CVA, hypothyroidism who presented with AMS, found to have sepsis 2/2 nonCOVID coronavirus vs aspiration PNA, a/w ROHIT. Cardiology consulted for possible syncope.    Review of studies  TTE 1/3/23: Septal knuckle without LVOTO. Normal biventricular size/function. G1DD. Mild MR. Mild TR. PASP 40. RA pressure 3  EKG: NSR, LBBB    # Syncope  History suggests vasovagal vs orthostasis in setting of sepsis, less likely cardiac etiology  TTE without significant disease as above     89F PMH HTN, HLD, CVA, hypothyroidism who presented with AMS, found to have sepsis 2/2 nonCOVID coronavirus vs aspiration PNA, a/w ROHIT. Cardiology consulted for possible syncope.    Review of studies  TTE 1/3/23: Septal knuckle without LVOTO. Normal biventricular size/function. G1DD. Mild MR. Mild TR. PASP 40. RA pressure 3  EKG: NSR, LBBB    # Syncope  History suggests vasovagal vs orthostasis in setting of sepsis, less likely cardiac etiology. Orthostatics negative here but this was after IVF given.  TTE without significant disease as above  -If symptoms recur after treatment of infection, she may benefit from outpatient event monitor  -Please ensure follow-up with her outpatient cardiologist in [place]   89F PMH HTN, HLD, CVA, hypothyroidism who presented with AMS, found to have sepsis 2/2 nonCOVID coronavirus vs aspiration PNA, a/w ROHIT. Cardiology consulted for possible syncope.    Review of studies  TTE 1/3/23: Septal knuckle without LVOTO. Normal biventricular size/function. G1DD. Mild MR. Mild TR. PASP 40. RA pressure 3  EKG: NSR, LBBB    # Syncope  History suggests vasovagal vs orthostasis in setting of sepsis, less likely cardiac etiology. Orthostatics negative here but this was after IVF given.  TTE without significant disease as above  -In lieu of tele, obtain daily EKG  -Please contact outpatient cardiologist to inquire about LBBB - is it new?  -If symptoms recur after treatment of infection, she may benefit from outpatient event monitor  -Please ensure follow-up with her outpatient cardiologist in [place]

## 2024-01-04 NOTE — PROGRESS NOTE ADULT - PROBLEM SELECTOR PLAN 4
Trop I 103. . Likely elevated 2/2 demand ischemia from sepsis. Patient currently denies any chest discomfort, acute SOB, extremity pain. [facility] ED spoke to Cardiology, who evaluated EKG and stated likely 2/2 demand ischemia.  ECG showed LLLB age not determined  Trop: 18 -> 105 -> 74    - Obtain EKG  - Cardiology consult Trop I 103. . Likely elevated 2/2 demand ischemia from sepsis. Patient currently denies any chest discomfort, acute SOB, extremity pain. [facility] ED spoke to Cardiology, who evaluated EKG and stated likely 2/2 demand ischemia.  ECG showed LLLB age not determined  Trop: 18 -> 105 -> 74    - Obtain EKG  - Cardiology consult Trop I 103. . Likely elevated 2/2 demand ischemia from sepsis. Patient currently denies any chest discomfort, acute SOB, extremity pain. [facility] ED spoke to Cardiology, who evaluated EKG and stated likely 2/2 demand ischemia.  ECG showed LLLB age not determined  Trop: 18 -> 105 -> 74    - f/u Cardiology recs Trop I 103. . Likely elevated 2/2 demand ischemia from sepsis. Patient currently denies any chest discomfort, acute SOB, extremity pain. [facility] ED spoke to Cardiology, who evaluated EKG and stated likely 2/2 demand ischemia.  ECG showed LLLB age not determined  Trop: 18 -> 105 -> 74    - f/u Cardiology recs

## 2024-01-04 NOTE — PROGRESS NOTE ADULT - SUBJECTIVE AND OBJECTIVE BOX
O/N Events: patient had a fever overnight    Subjective/ROS: Patient seen and examined at bedside. The patient states that she feels well today. Tolerated the EEG well. Denies ROS.    Denies Fever/Chills, HA, CP, SOB, n/v, changes in bowel/urinary habits.  12pt ROS otherwise negative.    VITALS  Vital Signs Last 24 Hrs  T(C): 36.6 (04 Jan 2024 09:58), Max: 38.6 (03 Jan 2024 16:43)  T(F): 97.8 (04 Jan 2024 09:58), Max: 101.5 (03 Jan 2024 16:43)  HR: 80 (04 Jan 2024 09:58) (77 - 81)  BP: 118/67 (04 Jan 2024 09:58) (94/55 - 126/66)  BP(mean): --  RR: 15 (04 Jan 2024 09:58) (15 - 18)  SpO2: 96% (04 Jan 2024 09:58) (93% - 96%)    Parameters below as of 04 Jan 2024 09:58  Patient On (Oxygen Delivery Method): room air        CAPILLARY BLOOD GLUCOSE          PHYSICAL EXAM  General: NAD  Head: NC/AT; MMM; PERRL; EOMI;  Neck: Supple; no JVD  Respiratory: CTAB; no wheezes/rales/rhonchi  Cardiovascular: Regular rhythm/rate; S1/S2+, no murmurs, rubs gallops   Gastrointestinal: Soft; NTND; bowel sounds normal and present  Extremities: WWP; no edema/cyanosis  Neurological: A&Ox3, CNII-XII grossly intact; no obvious focal deficits    Antimicrobials:  MEDICATIONS  (STANDING):  cefTRIAXone   IVPB 2000 milliGRAM(s) IV Intermittent every 24 hours      Standing Medications:  MEDICATIONS  (STANDING):  atorvastatin 40 milliGRAM(s) Oral at bedtime  cefTRIAXone   IVPB 2000 milliGRAM(s) IV Intermittent every 24 hours  heparin   Injectable 5000 Unit(s) SubCutaneous every 8 hours  influenza  Vaccine (HIGH DOSE) 0.7 milliLiter(s) IntraMuscular once  levothyroxine 75 MICROGram(s) Oral daily  metoprolol succinate ER 25 milliGRAM(s) Oral daily      PRN Medications:  MEDICATIONS  (PRN):  acetaminophen     Tablet .. 650 milliGRAM(s) Oral every 6 hours PRN Temp greater or equal to 38C (100.4F), Mild Pain (1 - 3)  aluminum hydroxide/magnesium hydroxide/simethicone Suspension 30 milliLiter(s) Oral every 4 hours PRN Dyspepsia  melatonin 3 milliGRAM(s) Oral at bedtime PRN Insomnia  ondansetron Injectable 4 milliGRAM(s) IV Push every 8 hours PRN Nausea and/or Vomiting      Allergy Status Unknown      LABS                        10.7   12.55 )-----------( 278      ( 04 Jan 2024 05:30 )             33.5     01-04    141  |  108  |  21  ----------------------------<  94  3.9   |  22  |  1.07    Ca    8.1<L>      04 Jan 2024 05:30  Phos  2.6     01-04  Mg     2.4     01-04      PT/INR - ( 03 Jan 2024 06:12 )   PT: 11.4 sec;   INR: 1.00          PTT - ( 03 Jan 2024 06:12 )  PTT:29.9 sec  Urinalysis Basic - ( 04 Jan 2024 05:30 )    Color: x / Appearance: x / SG: x / pH: x  Gluc: 94 mg/dL / Ketone: x  / Bili: x / Urobili: x   Blood: x / Protein: x / Nitrite: x   Leuk Esterase: x / RBC: x / WBC x   Sq Epi: x / Non Sq Epi: x / Bacteria: x      CARDIAC MARKERS ( 03 Jan 2024 05:52 )  x     / x     / 1381 U/L / x     / 8.6 ng/mL  CARDIAC MARKERS ( 02 Jan 2024 20:07 )  x     / x     / 394 U/L / x     / 2.4 ng/mL        Culture - Blood (collected 01-03-24 @ 20:19)  Source: .Blood Blood-Venous  Preliminary Report (01-04-24 @ 10:00):    No growth at 12 hours    Urinalysis with Rflx Culture (collected 01-03-24 @ 19:53)    Culture - Urine (collected 01-03-24 @ 19:53)  Source: Clean Catch None  Preliminary Report (01-04-24 @ 08:47):    Culture in progress        IMAGING/EKG/ETC   O/N Events: patient had a fever overnight    Subjective/ROS: Patient seen and examined at bedside. The patient states that she feels well today. Tolerated the EEG well. Denies ROS.    Denies Fever/Chills, HA, CP, SOB, n/v, changes in bowel/urinary habits.  12pt ROS otherwise negative.    VITALS  Vital Signs Last 24 Hrs  T(C): 36.6 (04 Jan 2024 09:58), Max: 38.6 (03 Jan 2024 16:43)  T(F): 97.8 (04 Jan 2024 09:58), Max: 101.5 (03 Jan 2024 16:43)  HR: 80 (04 Jan 2024 09:58) (77 - 81)  BP: 118/67 (04 Jan 2024 09:58) (94/55 - 126/66)  BP(mean): --  RR: 15 (04 Jan 2024 09:58) (15 - 18)  SpO2: 96% (04 Jan 2024 09:58) (93% - 96%)    Parameters below as of 04 Jan 2024 09:58  Patient On (Oxygen Delivery Method): room air        CAPILLARY BLOOD GLUCOSE          PHYSICAL EXAM  General: NAD  Head: NC/AT; MMM; PERRL; EOMI;  Neck: Supple; no JVD  Respiratory: CTAB; no wheezes/rales/rhonchi, mild cough  Cardiovascular: Regular rhythm/rate; S1/S2+, no murmurs, rubs gallops   Gastrointestinal: Soft; NTND;  Extremities: WWP; no edema/cyanosis  Neurological: A&Ox3, CNII-XII grossly intact; no obvious focal deficits    Antimicrobials:  MEDICATIONS  (STANDING):  cefTRIAXone   IVPB 2000 milliGRAM(s) IV Intermittent every 24 hours      Standing Medications:  MEDICATIONS  (STANDING):  atorvastatin 40 milliGRAM(s) Oral at bedtime  cefTRIAXone   IVPB 2000 milliGRAM(s) IV Intermittent every 24 hours  heparin   Injectable 5000 Unit(s) SubCutaneous every 8 hours  influenza  Vaccine (HIGH DOSE) 0.7 milliLiter(s) IntraMuscular once  levothyroxine 75 MICROGram(s) Oral daily  metoprolol succinate ER 25 milliGRAM(s) Oral daily      PRN Medications:  MEDICATIONS  (PRN):  acetaminophen     Tablet .. 650 milliGRAM(s) Oral every 6 hours PRN Temp greater or equal to 38C (100.4F), Mild Pain (1 - 3)  aluminum hydroxide/magnesium hydroxide/simethicone Suspension 30 milliLiter(s) Oral every 4 hours PRN Dyspepsia  melatonin 3 milliGRAM(s) Oral at bedtime PRN Insomnia  ondansetron Injectable 4 milliGRAM(s) IV Push every 8 hours PRN Nausea and/or Vomiting      Allergy Status Unknown      LABS                        10.7   12.55 )-----------( 278      ( 04 Jan 2024 05:30 )             33.5     01-04    141  |  108  |  21  ----------------------------<  94  3.9   |  22  |  1.07    Ca    8.1<L>      04 Jan 2024 05:30  Phos  2.6     01-04  Mg     2.4     01-04      PT/INR - ( 03 Jan 2024 06:12 )   PT: 11.4 sec;   INR: 1.00          PTT - ( 03 Jan 2024 06:12 )  PTT:29.9 sec  Urinalysis Basic - ( 04 Jan 2024 05:30 )    Color: x / Appearance: x / SG: x / pH: x  Gluc: 94 mg/dL / Ketone: x  / Bili: x / Urobili: x   Blood: x / Protein: x / Nitrite: x   Leuk Esterase: x / RBC: x / WBC x   Sq Epi: x / Non Sq Epi: x / Bacteria: x      CARDIAC MARKERS ( 03 Jan 2024 05:52 )  x     / x     / 1381 U/L / x     / 8.6 ng/mL  CARDIAC MARKERS ( 02 Jan 2024 20:07 )  x     / x     / 394 U/L / x     / 2.4 ng/mL        Culture - Blood (collected 01-03-24 @ 20:19)  Source: .Blood Blood-Venous  Preliminary Report (01-04-24 @ 10:00):    No growth at 12 hours    Urinalysis with Rflx Culture (collected 01-03-24 @ 19:53)    Culture - Urine (collected 01-03-24 @ 19:53)  Source: Clean Catch None  Preliminary Report (01-04-24 @ 08:47):    Culture in progress        IMAGING/EKG/ETC

## 2024-01-04 NOTE — PROGRESS NOTE ADULT - ATTENDING COMMENTS
Patient was seen and examined at bedside on 1/4/2024 at 1230 pm with sons at bedside. She has no acute complaints, feels dramatically improved. Denies SOB, CP. ROS is otherwise negative. Vitals, labwork and pertinent imaging reviewed. Physical exam - NAD, AAO x 4, PERRLA, EOMI, supple neck, chest - decreased BS at L base, CV - rrr, s1s2, no m/r/g, abd - soft, + BS, NTND, ext - wwp, psych - normal affect, skin - no rash    Plan  -Suspect encephalopathy 2/2 dehydration/hypotension but will rule out other causes as well  -C/w  CTX  -F/u cultures  -Repeat CXR Pa/La showing likely LLL infiltrate  -Cardiology consult  -EEG - no reported seizure  -MRSA swab - negative  -legionella, strep pneumonia   -PT/OT

## 2024-01-04 NOTE — PROGRESS NOTE ADULT - PROBLEM SELECTOR PLAN 8
Plan:  F: s/p 2L NS in the ED   E: replete K<4, Mg<2  N: DASH/TLC  VTE Prophylaxis: Heparin SubQ  C: Full Code  D: ALEXIS
Plan:  F: s/p 2L NS in the ED   E: replete K<4, Mg<2  N: DASH/TLC  VTE Prophylaxis: Heparin SubQ  C: Full Code  D: ALEXIS

## 2024-01-04 NOTE — PROGRESS NOTE ADULT - PROBLEM SELECTOR PLAN 1
On admission met 3/4 SIRS criteria with 101F, , WBC 15. Likely 2/2 non-COVID SARS vs aspiration PNA vs. less likely UTI. Lactate 3.1, repeat 1.7. CXR unremarkable. U/A negative. S/p Vanc 1g, CTX 1g, 2L NS.    - f/u BCx, UCx  - C/w CTX 2g qd   - Reperfusion assessment completed On admission met 3/4 SIRS criteria with 101F, , WBC 15. Likely 2/2 non-COVID SARS vs aspiration PNA vs. less likely UTI. Lactate 3.1, repeat 1.7. CXR unremarkable. U/A negative. S/p Vanc 1g, CTX 1g, 2L NS.  BCx no growth 24h    - C/w CTX 2g qd   - Reperfusion assessment completed

## 2024-01-04 NOTE — CONSULT NOTE ADULT - SUBJECTIVE AND OBJECTIVE BOX
HPI:  89-year-old Belarusian speaking female w/ a PMHx of HTN, HLD, "minor" CVA 15 years ago, hypothyroidism after thyroidectomy, presents to [facility] for AMS. Daughter (Nora) at bedside to assist with collateral. Patient was planning to go to [place] today to stay with son for a few days. On the car ride over to [place] to  a family member, patient rolled her eyes, started moaning and inability to speak. They brought her to [facility] and she was found to have a BP 60/40 in triage with inability to follow commands. A stroke code was called, which was unremarkable for an acute event. Patient was given empiric antibiotics and IVF. Soon after, patient returned to her baseline mentation. Per daughter, patient does not have history of seizure or cardiac history. Prior to the incident, patient endorsed feeling in her usual state of health. Patient denies any recent sick contacts, fevers, chills, nausea, vomiting, headache, acute sob, chest pain, abdominal pain, genitourinary sx, extremity pain or swelling.     ED Course:  Vitals: 101F, , /89, RR 16(94%) on Room air  Imaging: CTH - no acute hemorrhage, mod chronic microvascular ischemic changes. CT perfusion normal  Consults: None  Interventions: CTX 1g, Vancomycin 1g, Tylenol 1g, 2L NS, Ibuprofen 800mg    (03 Jan 2024 04:18)      ROS: A 10-point review of systems was otherwise negative.    PAST MEDICAL & SURGICAL HISTORY:  Hypothyroidism      CVA (cerebrovascular accident)      HTN (hypertension)      No significant past surgical history          SOCIAL HISTORY:  FAMILY HISTORY:  No pertinent family history in first degree relatives        ALLERGIES: 	  Allergy Status Unknown            MEDICATIONS:  acetaminophen     Tablet .. 650 milliGRAM(s) Oral every 6 hours PRN  aluminum hydroxide/magnesium hydroxide/simethicone Suspension 30 milliLiter(s) Oral every 4 hours PRN  atorvastatin 40 milliGRAM(s) Oral at bedtime  cefTRIAXone   IVPB 2000 milliGRAM(s) IV Intermittent every 24 hours  heparin   Injectable 5000 Unit(s) SubCutaneous every 8 hours  influenza  Vaccine (HIGH DOSE) 0.7 milliLiter(s) IntraMuscular once  levothyroxine 75 MICROGram(s) Oral daily  melatonin 3 milliGRAM(s) Oral at bedtime PRN  metoprolol succinate ER 25 milliGRAM(s) Oral daily  ondansetron Injectable 4 milliGRAM(s) IV Push every 8 hours PRN      HOME MEDICATIONS:  Lipitor 40 mg oral tablet: 1 tab(s) orally once a day (at bedtime)  lisinopril-hydrochlorothiazide 20 mg-12.5 mg oral tablet: 1 tab(s) orally once a day  Synthroid 75 mcg (0.075 mg) oral tablet: 1 tab(s) orally once a day  Toprol-XL 25 mg oral tablet, extended release: 1 tab(s) orally once a day      PHYSICAL EXAM:      I/O Summary 24H      T(F): 97.8 (01-04-24 @ 09:58), Max: 101.5 (01-03-24 @ 16:43)  HR: 80 (01-04-24 @ 09:58) (77 - 81)  BP: 118/67 (01-04-24 @ 09:58) (94/55 - 126/66)  BP(mean): --  ABP: --  ABP(mean): --  RR: 15 (01-04-24 @ 09:58) (15 - 18)  SpO2: 96% (01-04-24 @ 09:58) (93% - 96%)    GEN: Awake, comfortable. NAD.   HEENT: NCAT, PERRL, EOMI. Mucosa moist. No JVD.   RESP: CTA b/l  CV: RRR, normal s1/s2. No m/r/g.  ABD: Soft, NTND. BS+  EXT: Warm. No edema, clubbing, or cyanosis.   NEURO: AAOx3. No focal deficits.      	  LABS:	 	    Cardiac Markers   01-03-24 @ 12:00: HS Trop T 74<HH> / CK x     / CKMB x      01-03-24 @ 05:52: HS Trop T 105<HH> / CK 1381<H> / CKMB 8.6<H>  01-02-24 @ 20:07: HS Trop T x     / <H> / CKMB 2.4              CBC 01-04-24 @ 05:30                        10.7   12.55 )-----------( 278                   33.5       Hgb trend: 10.7 <-- , 12.3 <-- , 13.4 <--   WBC trend: 12.55 <-- , 21.14 <-- , 15.32 <--     CMP 01-04-24 @ 05:30    141  |  108  |  21  ----------------------------<  94  3.9   |  22  |  1.07    Ca    8.1<L>      01-04-24 @ 05:30  Phos  2.6     01-04  Mg     2.4     01-04        Serum Cr trend: 1.07 <-- , 1.39 <-- , 1.87 <--   proBNP:   Lipid Profile:   HgA1c:   TSH:     TELEMETRY: 	    ECG:  	  RADIOLOGY:   ECHO:  STRESS:  CATH:   HPI:  89-year-old Lao speaking female w/ a PMHx of HTN, HLD, "minor" CVA 15 years ago, hypothyroidism after thyroidectomy, presents to [facility] for AMS. Daughter (Nora) at bedside to assist with collateral. Patient was planning to go to [place] today to stay with son for a few days. On the car ride over to [place] to  a family member, patient rolled her eyes, started moaning and inability to speak. They brought her to [facility] and she was found to have a BP 60/40 in triage with inability to follow commands. A stroke code was called, which was unremarkable for an acute event. Patient was given empiric antibiotics and IVF. Soon after, patient returned to her baseline mentation. Per daughter, patient does not have history of seizure or cardiac history. Prior to the incident, patient endorsed feeling in her usual state of health. Patient denies any recent sick contacts, fevers, chills, nausea, vomiting, headache, acute sob, chest pain, abdominal pain, genitourinary sx, extremity pain or swelling.     ED Course:  Vitals: 101F, , /89, RR 16(94%) on Room air  Imaging: CTH - no acute hemorrhage, mod chronic microvascular ischemic changes. CT perfusion normal  Consults: None  Interventions: CTX 1g, Vancomycin 1g, Tylenol 1g, 2L NS, Ibuprofen 800mg    (03 Jan 2024 04:18)      ROS: A 10-point review of systems was otherwise negative.    PAST MEDICAL & SURGICAL HISTORY:  Hypothyroidism      CVA (cerebrovascular accident)      HTN (hypertension)      No significant past surgical history          SOCIAL HISTORY:  FAMILY HISTORY:  No pertinent family history in first degree relatives        ALLERGIES: 	  Allergy Status Unknown            MEDICATIONS:  acetaminophen     Tablet .. 650 milliGRAM(s) Oral every 6 hours PRN  aluminum hydroxide/magnesium hydroxide/simethicone Suspension 30 milliLiter(s) Oral every 4 hours PRN  atorvastatin 40 milliGRAM(s) Oral at bedtime  cefTRIAXone   IVPB 2000 milliGRAM(s) IV Intermittent every 24 hours  heparin   Injectable 5000 Unit(s) SubCutaneous every 8 hours  influenza  Vaccine (HIGH DOSE) 0.7 milliLiter(s) IntraMuscular once  levothyroxine 75 MICROGram(s) Oral daily  melatonin 3 milliGRAM(s) Oral at bedtime PRN  metoprolol succinate ER 25 milliGRAM(s) Oral daily  ondansetron Injectable 4 milliGRAM(s) IV Push every 8 hours PRN      HOME MEDICATIONS:  Lipitor 40 mg oral tablet: 1 tab(s) orally once a day (at bedtime)  lisinopril-hydrochlorothiazide 20 mg-12.5 mg oral tablet: 1 tab(s) orally once a day  Synthroid 75 mcg (0.075 mg) oral tablet: 1 tab(s) orally once a day  Toprol-XL 25 mg oral tablet, extended release: 1 tab(s) orally once a day      PHYSICAL EXAM:      I/O Summary 24H      T(F): 97.8 (01-04-24 @ 09:58), Max: 101.5 (01-03-24 @ 16:43)  HR: 80 (01-04-24 @ 09:58) (77 - 81)  BP: 118/67 (01-04-24 @ 09:58) (94/55 - 126/66)  BP(mean): --  ABP: --  ABP(mean): --  RR: 15 (01-04-24 @ 09:58) (15 - 18)  SpO2: 96% (01-04-24 @ 09:58) (93% - 96%)    GEN: Awake, comfortable. NAD.   HEENT: NCAT, PERRL, EOMI. Mucosa moist. No JVD.   RESP: CTA b/l  CV: RRR, normal s1/s2. No m/r/g.  ABD: Soft, NTND. BS+  EXT: Warm. No edema, clubbing, or cyanosis.   NEURO: AAOx3. No focal deficits.      	  LABS:	 	    Cardiac Markers   01-03-24 @ 12:00: HS Trop T 74<HH> / CK x     / CKMB x      01-03-24 @ 05:52: HS Trop T 105<HH> / CK 1381<H> / CKMB 8.6<H>  01-02-24 @ 20:07: HS Trop T x     / <H> / CKMB 2.4              CBC 01-04-24 @ 05:30                        10.7   12.55 )-----------( 278                   33.5       Hgb trend: 10.7 <-- , 12.3 <-- , 13.4 <--   WBC trend: 12.55 <-- , 21.14 <-- , 15.32 <--     CMP 01-04-24 @ 05:30    141  |  108  |  21  ----------------------------<  94  3.9   |  22  |  1.07    Ca    8.1<L>      01-04-24 @ 05:30  Phos  2.6     01-04  Mg     2.4     01-04        Serum Cr trend: 1.07 <-- , 1.39 <-- , 1.87 <--   proBNP:   Lipid Profile:   HgA1c:   TSH:     TELEMETRY: 	    ECG:  	  RADIOLOGY:   ECHO:  STRESS:  CATH:   HPI:  89-year-old Hungarian speaking female w/ a PMHx of HTN, HLD, "minor" CVA 15 years ago, hypothyroidism after thyroidectomy, presents to [facility] for AMS. Daughter (Nora) at bedside to assist with collateral. Patient was planning to go to [place] today to stay with son for a few days. On the car ride over to [place] to  a family member, patient rolled her eyes, started moaning and inability to speak. They brought her to [facility] and she was found to have a BP 60/40 in triage with inability to follow commands. A stroke code was called, which was unremarkable for an acute event. Patient was given empiric antibiotics and IVF. Soon after, patient returned to her baseline mentation. Per daughter, patient does not have history of seizure or cardiac history. Prior to the incident, patient endorsed feeling in her usual state of health. Patient denies any recent sick contacts, fevers, chills, nausea, vomiting, headache, acute sob, chest pain, abdominal pain, genitourinary sx, extremity pain or swelling.     ED Course:  Vitals: 101F, , /89, RR 16(94%) on Room air  Imaging: CTH - no acute hemorrhage, mod chronic microvascular ischemic changes. CT perfusion normal  Consults: None  Interventions: CTX 1g, Vancomycin 1g, Tylenol 1g, 2L NS, Ibuprofen 800mg    (03 Jan 2024 04:18)    Consultant HPI  89F PMH HTN, HLD, CVA, hypothyroidism who presented with syncopal event. Patient was in the front passenger seat of her grandson's car when she felt total body warmth, then SOB, then vomited a small amount before becoming unresponsive for a few seconds. During this time, she was reported as staring straight ahead, not responding to questions, not following commands. She returned to speaking a few seconds later and was immediately able to answer all questions and move normally. No bowel/bladder incontinence. She denied feeling nauseous before the event despite vomiting. No antecedent chest pain or palpitations. Never happened before. She lives in [place] with her daughter and was visiting [place] for the holidays. She sees a Cardiologist, who in October reduced one of her BP meds during a routine appointment. Denies ever having echo or stress test.     Per [facility] note, pt arrived to their ER hypotensive and not answering questions. She was found to be SIRS+ and received IVF with improvement. Stroke code imaging was negative.       ROS: A 10-point review of systems was otherwise negative.    PAST MEDICAL & SURGICAL HISTORY:  Hypothyroidism      CVA (cerebrovascular accident)      HTN (hypertension)      No significant past surgical history          SOCIAL HISTORY:  FAMILY HISTORY:  No pertinent family history in first degree relatives        ALLERGIES: 	  Allergy Status Unknown            MEDICATIONS:  acetaminophen     Tablet .. 650 milliGRAM(s) Oral every 6 hours PRN  aluminum hydroxide/magnesium hydroxide/simethicone Suspension 30 milliLiter(s) Oral every 4 hours PRN  atorvastatin 40 milliGRAM(s) Oral at bedtime  cefTRIAXone   IVPB 2000 milliGRAM(s) IV Intermittent every 24 hours  heparin   Injectable 5000 Unit(s) SubCutaneous every 8 hours  influenza  Vaccine (HIGH DOSE) 0.7 milliLiter(s) IntraMuscular once  levothyroxine 75 MICROGram(s) Oral daily  melatonin 3 milliGRAM(s) Oral at bedtime PRN  metoprolol succinate ER 25 milliGRAM(s) Oral daily  ondansetron Injectable 4 milliGRAM(s) IV Push every 8 hours PRN      HOME MEDICATIONS:  Lipitor 40 mg oral tablet: 1 tab(s) orally once a day (at bedtime)  lisinopril-hydrochlorothiazide 20 mg-12.5 mg oral tablet: 1 tab(s) orally once a day  Synthroid 75 mcg (0.075 mg) oral tablet: 1 tab(s) orally once a day  Toprol-XL 25 mg oral tablet, extended release: 1 tab(s) orally once a day      PHYSICAL EXAM:      I/O Summary 24H      T(F): 97.8 (01-04-24 @ 09:58), Max: 101.5 (01-03-24 @ 16:43)  HR: 80 (01-04-24 @ 09:58) (77 - 81)  BP: 118/67 (01-04-24 @ 09:58) (94/55 - 126/66)  BP(mean): --  ABP: --  ABP(mean): --  RR: 15 (01-04-24 @ 09:58) (15 - 18)  SpO2: 96% (01-04-24 @ 09:58) (93% - 96%)    GEN: Awake, comfortable. NAD.   HEENT: NCAT, PERRL, EOMI. Mucosa moist. No JVD.   RESP: CTA b/l  CV: RRR, normal s1/s2. No m/r/g.  ABD: Soft, NTND. BS+  EXT: Warm. No edema, clubbing, or cyanosis.   NEURO: AAOx3. No focal deficits.      	  LABS:	 	    Cardiac Markers   01-03-24 @ 12:00: HS Trop T 74<HH> / CK x     / CKMB x      01-03-24 @ 05:52: HS Trop T 105<HH> / CK 1381<H> / CKMB 8.6<H>  01-02-24 @ 20:07: HS Trop T x     / <H> / CKMB 2.4              CBC 01-04-24 @ 05:30                        10.7   12.55 )-----------( 278                   33.5       Hgb trend: 10.7 <-- , 12.3 <-- , 13.4 <--   WBC trend: 12.55 <-- , 21.14 <-- , 15.32 <--     CMP 01-04-24 @ 05:30    141  |  108  |  21  ----------------------------<  94  3.9   |  22  |  1.07    Ca    8.1<L>      01-04-24 @ 05:30  Phos  2.6     01-04  Mg     2.4     01-04        Serum Cr trend: 1.07 <-- , 1.39 <-- , 1.87 <--   proBNP:   Lipid Profile:   HgA1c:   TSH:     TELEMETRY: 	    ECG:  	  RADIOLOGY:   ECHO:  STRESS:  CATH:   HPI:  89-year-old Luxembourgish speaking female w/ a PMHx of HTN, HLD, "minor" CVA 15 years ago, hypothyroidism after thyroidectomy, presents to [facility] for AMS. Daughter (Nora) at bedside to assist with collateral. Patient was planning to go to [place] today to stay with son for a few days. On the car ride over to [place] to  a family member, patient rolled her eyes, started moaning and inability to speak. They brought her to [facility] and she was found to have a BP 60/40 in triage with inability to follow commands. A stroke code was called, which was unremarkable for an acute event. Patient was given empiric antibiotics and IVF. Soon after, patient returned to her baseline mentation. Per daughter, patient does not have history of seizure or cardiac history. Prior to the incident, patient endorsed feeling in her usual state of health. Patient denies any recent sick contacts, fevers, chills, nausea, vomiting, headache, acute sob, chest pain, abdominal pain, genitourinary sx, extremity pain or swelling.     ED Course:  Vitals: 101F, , /89, RR 16(94%) on Room air  Imaging: CTH - no acute hemorrhage, mod chronic microvascular ischemic changes. CT perfusion normal  Consults: None  Interventions: CTX 1g, Vancomycin 1g, Tylenol 1g, 2L NS, Ibuprofen 800mg    (03 Jan 2024 04:18)    Consultant HPI  89F PMH HTN, HLD, CVA, hypothyroidism who presented with syncopal event. Patient was in the front passenger seat of her grandson's car when she felt total body warmth, then SOB, then vomited a small amount before becoming unresponsive for a few seconds. During this time, she was reported as staring straight ahead, not responding to questions, not following commands. She returned to speaking a few seconds later and was immediately able to answer all questions and move normally. No bowel/bladder incontinence. She denied feeling nauseous before the event despite vomiting. No antecedent chest pain or palpitations. Never happened before. She lives in [place] with her daughter and was visiting [place] for the holidays. She sees a Cardiologist, who in October reduced one of her BP meds during a routine appointment. Denies ever having echo or stress test.     Per [facility] note, pt arrived to their ER hypotensive and not answering questions. She was found to be SIRS+ and received IVF with improvement. Stroke code imaging was negative.       ROS: A 10-point review of systems was otherwise negative.    PAST MEDICAL & SURGICAL HISTORY:  Hypothyroidism      CVA (cerebrovascular accident)      HTN (hypertension)      No significant past surgical history          SOCIAL HISTORY:  FAMILY HISTORY:  No pertinent family history in first degree relatives        ALLERGIES: 	  Allergy Status Unknown            MEDICATIONS:  acetaminophen     Tablet .. 650 milliGRAM(s) Oral every 6 hours PRN  aluminum hydroxide/magnesium hydroxide/simethicone Suspension 30 milliLiter(s) Oral every 4 hours PRN  atorvastatin 40 milliGRAM(s) Oral at bedtime  cefTRIAXone   IVPB 2000 milliGRAM(s) IV Intermittent every 24 hours  heparin   Injectable 5000 Unit(s) SubCutaneous every 8 hours  influenza  Vaccine (HIGH DOSE) 0.7 milliLiter(s) IntraMuscular once  levothyroxine 75 MICROGram(s) Oral daily  melatonin 3 milliGRAM(s) Oral at bedtime PRN  metoprolol succinate ER 25 milliGRAM(s) Oral daily  ondansetron Injectable 4 milliGRAM(s) IV Push every 8 hours PRN      HOME MEDICATIONS:  Lipitor 40 mg oral tablet: 1 tab(s) orally once a day (at bedtime)  lisinopril-hydrochlorothiazide 20 mg-12.5 mg oral tablet: 1 tab(s) orally once a day  Synthroid 75 mcg (0.075 mg) oral tablet: 1 tab(s) orally once a day  Toprol-XL 25 mg oral tablet, extended release: 1 tab(s) orally once a day      PHYSICAL EXAM:      I/O Summary 24H      T(F): 97.8 (01-04-24 @ 09:58), Max: 101.5 (01-03-24 @ 16:43)  HR: 80 (01-04-24 @ 09:58) (77 - 81)  BP: 118/67 (01-04-24 @ 09:58) (94/55 - 126/66)  BP(mean): --  ABP: --  ABP(mean): --  RR: 15 (01-04-24 @ 09:58) (15 - 18)  SpO2: 96% (01-04-24 @ 09:58) (93% - 96%)    GEN: Awake, comfortable. NAD.   HEENT: NCAT, PERRL, EOMI. Mucosa moist. No JVD.   RESP: CTA b/l  CV: RRR, normal s1/s2. No m/r/g.  ABD: Soft, NTND. BS+  EXT: Warm. No edema, clubbing, or cyanosis.   NEURO: AAOx3. No focal deficits.      	  LABS:	 	    Cardiac Markers   01-03-24 @ 12:00: HS Trop T 74<HH> / CK x     / CKMB x      01-03-24 @ 05:52: HS Trop T 105<HH> / CK 1381<H> / CKMB 8.6<H>  01-02-24 @ 20:07: HS Trop T x     / <H> / CKMB 2.4              CBC 01-04-24 @ 05:30                        10.7   12.55 )-----------( 278                   33.5       Hgb trend: 10.7 <-- , 12.3 <-- , 13.4 <--   WBC trend: 12.55 <-- , 21.14 <-- , 15.32 <--     CMP 01-04-24 @ 05:30    141  |  108  |  21  ----------------------------<  94  3.9   |  22  |  1.07    Ca    8.1<L>      01-04-24 @ 05:30  Phos  2.6     01-04  Mg     2.4     01-04        Serum Cr trend: 1.07 <-- , 1.39 <-- , 1.87 <--   proBNP:   Lipid Profile:   HgA1c:   TSH:     TELEMETRY: 	    ECG:  	  RADIOLOGY:   ECHO:  STRESS:  CATH:

## 2024-01-04 NOTE — PROGRESS NOTE ADULT - PROBLEM SELECTOR PLAN 9
Message left to return call to clinic   Patient presented with an acute LOC with moaning and difficulty speaking as mentioned above  Echo: The left ventricle is normal in size, wall thickness, and systolic function with a calculated ejection fraction of 60-65%  Orthostics negative     - Rest as above

## 2024-01-04 NOTE — DISCHARGE NOTE PROVIDER - NSDCCPCAREPLAN_GEN_ALL_CORE_FT
PRINCIPAL DISCHARGE DIAGNOSIS  Diagnosis: Pneumonia  Assessment and Plan of Treatment: You were diagnosed with pneumonia, or an infection of the lungs during your admission to [facility]. This was noted based on your symptom profile and findings on chest X-ray. You were treated with antibiotics throughout your hospital course. Please continue to take cefpodoxime once every 12 hours until the course is finished. Please follow-up with your primary care physician when you leave the hospital. Should you experience symptoms such as but not limited to: worsening shortness of breath, wheezing, severe cough, coughing bloody sputum, unrelenting/high fever (>103 F or lasting >3 days), and chest pain, please return to the emergency department for interval evaluation.        SECONDARY DISCHARGE DIAGNOSES  Diagnosis: Severe sepsis  Assessment and Plan of Treatment: A life-threatening complication of an infection.  Sepsis occurs when chemicals released in the bloodstream to fight an infection trigger inflammation throughout the body. This can cause a cascade of changes that damage multiple organ systems, leading them to fail, sometimes even resulting in death.  Symptoms include fever, difficulty breathing, low blood pressure, fast heart rate, and mental confusion.  Treatment includes antibiotics and intravenous fluids.  Please follow up with your primary care doctor for ongoing evaluation after your hospital stay       PRINCIPAL DISCHARGE DIAGNOSIS  Diagnosis: Pneumonia  Assessment and Plan of Treatment: You were diagnosed with pneumonia, or an infection of the lungs during your admission to [facility]. This was noted based on your symptom profile and findings on chest X-ray. You were treated with antibiotics throughout your hospital course. Please continue to take cefpodoxime once every 12 hours until the course is finished. Please follow-up with your primary care physician when you leave the hospital. Should you experience symptoms such as but not limited to: worsening shortness of breath, wheezing, severe cough, coughing bloody sputum, unrelenting/high fever (>103 F or lasting >3 days), and chest pain, please return to the emergency department for interval evaluation.        SECONDARY DISCHARGE DIAGNOSES  Diagnosis: Severe sepsis  Assessment and Plan of Treatment: A life-threatening complication of an infection.  Sepsis occurs when chemicals released in the bloodstream to fight an infection trigger inflammation throughout the body. This can cause a cascade of changes that damage multiple organ systems, leading them to fail, sometimes even resulting in death.  Symptoms include fever, difficulty breathing, low blood pressure, fast heart rate, and mental confusion.  Treatment includes antibiotics and intravenous fluids.  Please follow up with your primary care doctor for ongoing evaluation after your hospital stay

## 2024-01-04 NOTE — DISCHARGE NOTE PROVIDER - PROVIDER TOKENS
FREE:[LAST:[Jose],FIRST:[Montse],PHONE:[phone]],FAX:[(   )    -],ADDRESS:[[address] [address]],ESTABLISHEDPATIENT:[T]],FREE:[LAST:[Alvin],FIRST:[J Carlos],PHONE:[phone]],FAX:[(   )    -],ADDRESS:[[facility]  [place] [address]],ESTABLISHEDPATIENT:[T]] FREE:[LAST:[Jose],FIRST:[Montse],PHONE:[phone]],FAX:[(   )    -],ADDRESS:[[address] [address]],ESTABLISHEDPATIENT:[T]],FREE:[LAST:[Alvin],FIRST:[J Carlos],PHONE:[phone]],FAX:[(   )    -],ADDRESS:[[facility]  [place] [address]],ESTABLISHEDPATIENT:[T]]

## 2024-01-04 NOTE — PROGRESS NOTE ADULT - PROBLEM SELECTOR PLAN 7
On admission, presented with /89. On home Lisinopril 20-HCTZ 12.5mg qd and Toprol 25mg  - c/w home Toprol 25mg  - Hold Lisinopril 20-HCTZ 12.5mg qd i/s/o ROHIT On admission, presented with /89. On home Lisinopril 20-HCTZ 12.5mg qd and Toprol 25mg  - c/w home Toprol 25mg  - Hold Lisinopril 20-HCTZ 12.5mg qd i/s/o normotension

## 2024-01-04 NOTE — PROGRESS NOTE ADULT - PROBLEM SELECTOR PLAN 2
Patient became acutely altered in the car today while driving from [facility] to [facility]. Eyes rolled back. No fecal or urinary incontinence. No history of seizures. Remote history of CVA. Likely 2/2 TIA vs sepsis from non-COVID SARs vs. HTN. [facility].   Patient is currently AAOx3 without focal deficits, back at baseline  Utox (-), TSH 1.92 (on synthoid at home)  Echo: The left ventricle is normal in size, wall thickness, and systolic function with a calculated ejection fraction of 60-65%    - PT consulted  - fall precautions  - f/u vEEG placement to rule out seizure.  - Remaining as above Patient became acutely altered in the car today while driving from [facility] to [facility]. Eyes rolled back. No fecal or urinary incontinence. No history of seizures. Remote history of CVA. Likely 2/2 TIA vs sepsis from non-COVID SARs vs. HTN. [facility].   Patient is currently AAOx3 without focal deficits, back at baseline  Utox (-), TSH 1.92 (on synthoid at home)  Echo: The left ventricle is normal in size, wall thickness, and systolic function with a calculated ejection fraction of 60-65%    - PT consulted  - fall precautions  - f/u vEEG placement to rule out seizure.  - Remaining as above

## 2024-01-04 NOTE — CONSULT NOTE ADULT - SUBJECTIVE AND OBJECTIVE BOX
Patient is a 89y old  Female who presents with a chief complaint of SOB (2024 07:21)      HPI:  89-year-old Slovenian speaking female w/ a PMHx of HTN, HLD, "minor" CVA 15 years ago, hypothyroidism after thyroidectomy, presents to [facility] for AMS. Daughter (Nora) at bedside to assist with collateral. Patient was planning to go to [place] today to stay with son for a few days. On the car ride over to [place] to  a family member, patient rolled her eyes, started moaning and inability to speak. They brought her to [facility] and she was found to have a BP 60/40 in triage with inability to follow commands. A stroke code was called, which was unremarkable for an acute event. Patient was given empiric antibiotics and IVF. Soon after, patient returned to her baseline mentation. Per daughter, patient does not have history of seizure or cardiac history. Prior to the incident, patient endorsed feeling in her usual state of health. Patient denies any recent sick contacts, fevers, chills, nausea, vomiting, headache, acute sob, chest pain, abdominal pain, genitourinary sx, extremity pain or swelling.     ED Course:  Vitals: 101F, , /89, RR 16(94%) on Room air  Imaging: CTH - no acute hemorrhage, mod chronic microvascular ischemic changes. CT perfusion normal  Consults: None  Interventions: CTX 1g, Vancomycin 1g, Tylenol 1g, 2L NS, Ibuprofen 800mg    (2024 04:18)    PAST MEDICAL & SURGICAL HISTORY:  Hypothyroidism      CVA (cerebrovascular accident)      HTN (hypertension)      No significant past surgical history        MEDICATIONS  (STANDING):  atorvastatin 40 milliGRAM(s) Oral at bedtime  cefTRIAXone   IVPB 2000 milliGRAM(s) IV Intermittent every 24 hours  heparin   Injectable 5000 Unit(s) SubCutaneous every 8 hours  influenza  Vaccine (HIGH DOSE) 0.7 milliLiter(s) IntraMuscular once  levothyroxine 75 MICROGram(s) Oral daily  metoprolol succinate ER 25 milliGRAM(s) Oral daily    MEDICATIONS  (PRN):  acetaminophen     Tablet .. 650 milliGRAM(s) Oral every 6 hours PRN Temp greater or equal to 38C (100.4F), Mild Pain (1 - 3)  aluminum hydroxide/magnesium hydroxide/simethicone Suspension 30 milliLiter(s) Oral every 4 hours PRN Dyspepsia  melatonin 3 milliGRAM(s) Oral at bedtime PRN Insomnia  ondansetron Injectable 4 milliGRAM(s) IV Push every 8 hours PRN Nausea and/or Vomiting          Home Living Status :  lives with her daughter in a 1st floor apartment          -  Prior Home Care Services :  none       Baseline Functional Level Prior to Admission :             - ADL's/ IADL's :  independent         - Ambulatory status prior to admission :   walked with no assistive devices          FAMILY HISTORY:  No pertinent family history in first degree relatives        CBC Full  -  ( 2024 05:30 )  WBC Count : 12.55 K/uL  RBC Count : 3.45 M/uL  Hemoglobin : 10.7 g/dL  Hematocrit : 33.5 %  Platelet Count - Automated : 278 K/uL  Mean Cell Volume : 97.1 fl  Mean Cell Hemoglobin : 31.0 pg  Mean Cell Hemoglobin Concentration : 31.9 gm/dL  Auto Neutrophil # : 9.14 K/uL  Auto Lymphocyte # : 1.99 K/uL  Auto Monocyte # : 1.10 K/uL  Auto Eosinophil # : 0.20 K/uL  Auto Basophil # : 0.07 K/uL  Auto Neutrophil % : 72.7 %  Auto Lymphocyte % : 15.9 %  Auto Monocyte % : 8.8 %  Auto Eosinophil % : 1.6 %  Auto Basophil % : 0.6 %      -03    143  |  108  |  26<H>  ----------------------------<  96  3.6   |  23  |  1.39<H>    Ca    8.4      2024 05:52  Phos  3.7     01-  Mg     1.6     -    TPro  7.3  /  Alb  3.5  /  TBili  0.6  /  DBili  x   /  AST  47<H>  /  ALT  38  /  AlkPhos  62  01-02      Urinalysis Basic - ( 2024 19:53 )    Color: Yellow / Appearance: Cloudy / S.023 / pH: x  Gluc: x / Ketone: Negative mg/dL  / Bili: Negative / Urobili: 0.2 mg/dL   Blood: x / Protein: 30 mg/dL / Nitrite: Negative   Leuk Esterase: Small / RBC: 214 /HPF / WBC 15 /HPF   Sq Epi: x / Non Sq Epi: 7 /HPF / Bacteria: Few /HPF        Radiology :     < from: Xray Chest 1 View- PORTABLE-Urgent (Xray Chest 1 View- PORTABLE-Urgent .) (24 @ 19:21) >  ACC: [id number] EXAM:  XR CHEST PORTABLE URGENT 1V   ORDERED BY: KALEN JIM     PROCEDURE DATE:  2024          INTERPRETATION:  HISTORY: Possible aspiration PNA    TECHNIQUE: One view of the chest    COMPARISON: Chest one view 1/3/2024.    FINDINGS/  IMPRESSION:    Lines/devices: None.    Lungs/pleura: No pneumothorax. No pleural effusion. Continued suggestion   of asymmetric opacity over the left lower lung and left lower lobe   pneumonia cannot be excluded. Continued follow-up 2 viewchest   examination is suggested to confirm resolution.    Cardiomediastinal silhouette: Within normal limits.    < from: CT Brain Stroke Protocol (24 @ 14:05) >  ACC: [id number] EXAM:  CT ANGIO BRAIN STROKE PROTC IC   ORDERED BY: NAMRATA CUMMINGS     ACC: [id number] EXAM:  CT ANGIO NECK STROKE PROTCL IC   ORDERED BY: NAMRATA CUMMINGS     ACC: [id number] EXAM:  CT BRAIN STROKE PROTOCOL   ORDERED BY: NAMRATA CUMMINGS     ACC: [id number] EXAM:  CT BRAIN PERFUSION MAPS STROKE   ORDERED BY: NAMRATA CUMMINGS     PROCEDURE DATE:  2024          INTERPRETATION:  PROCEDURES:  CT Head without contrast  CT Perfusion with intravenous contrast.  CTA brain with intravenous contrast.  CTA neck with intravenous contrast.    INDICATION: Stroke code, altered mental status.    TECHNIQUE: CT Head: Multiple contiguous axial CT images of the head were   obtained from the base of the skull to the vertex without the   administration of intravenous contrast. Coronal and sagittal reformatted   images were obtained.  RAPID AI was used for preliminary interpretation.    CT Perfusion: Following the intravenous administration of 45 ml of   Omnipaque-350, serial axial images were obtained through the brain. The   CT perfusion data set was post processed per iSchemaViewRAPID protocol   generating color maps of CBF, CBV, MTT, and Tmax.    CTA Head and Neck: Multiple axial thin section were obtained from the   aortic arch through the neck and intracranial compartment up to the   calvarial vertex. Imaging is done after the IV bolus of 80 mL Omnipaque   350. MIP series are provided.    COMPARISON: None    FINDINGS:    CT Head:    The ventricles and sulci are prominent consistent with parenchymal volume   loss.    No evidence for intracranial hemorrhage, significant space occupying   process or recent territorial infarction.  No acute extra-axial fluid   collections are identified.    Gray-white matter differentiation is preserved. There are patchy foci of   periventricular and subcortical hypodensities consistent with moderate   chronic microvascular ischemic disease. .    The bones of the calvarium are intact.    There is fluid within the right maxillary sinus. The mastoid air cells   are well-aerated..    ***************************************************************************  ****************************  CT Perfusion:    The CBF <30% volume is 0 mL.  The Tmax > 6s volume is 0 mL.  The mismatch   volume is 0 mL.    ***************************************************************************  ****************************************    CTA Head and Neck:    INTRACRANIAL:  The internal carotid arteries are patent at the skull base and   intracranial compartment without occlusion or high grade stenosis. The   anterior and middle cerebral arteries are patent at their 1st and 2nd   order segments, and appear symmetric caliber.     The intracranial vertebral arteries, the basilar artery andboth   posterior cerebral arteries are patent. The left P1 segment is   hypoplastic with a prominent left posterior communicating artery. There   is irregularity with mild to moderate stenosis the distal right PCA.    There is no site of aneurysm within the resolution limitations of CTA.    EXTRACRANIAL:    The aortic arch is normal size and shows patency, with standard 3 vessel   configuration. No significant great vessel stenosis.    Both common carotid arteries are patent to the bifurcations.    There is predominantly calcified plaque of the proximal left internal   carotid artery resulting in mild stenosis per NASCET criteria  There is mixed calcified and noncalcified plaque of the proximal right   internal carotid artery resulting in mild stenosis per NASCET criteria.    Vertebral arteries are patent at their origins and throughout their   course in the neck.    Thyroid gland is not visualized.    IMPRESSION:    CT Head: No acute intracranial hemorrhage, mass effect or large   demarcated territorial infarction. Moderate chronic microvascular   ischemic changes are noted.    The last image was acquired on 2024 2:01 PM and the findings were   discussed with ADAMS Cummings by Dr. Cristian Menjivar on 2024 2:05 PM    CT Perfusion: Normal CT perfusion.    Intracranial CTA: No large vessel occlusion.  Mild to moderate stenosis of the distal right PCA.    Extracranial CTA: Mild stenosis the proximal bilateral internal carotid   arteries.    < end of copied text >          Review of Systems : per HPI         Vital Signs Last 24 Hrs  T(C): 36.6 (2024 05:08), Max: 38.6 (2024 16:43)  T(F): 97.9 (2024 05:08), Max: 101.5 (2024 16:43)  HR: 77 (2024 05:08) (77 - 97)  BP: 126/66 (2024 05:08) (94/55 - 126/66)  BP(mean): --  RR: 17 (2024 05:08) (17 - 18)  SpO2: 95% (2024 05:08) (93% - 95%)    Parameters below as of 2024 05:08  Patient On (Oxygen Delivery Method): room air            Physical Exam :  89 y o woman lying comfortably in semi Dinh's position , awake , alert , no acute complaints     Head : normocephalic , atraumatic    Eyes : PERRLA , EOMI , no nystagmus , sclera anicteric    ENT / FACE : neg nasal discharge , uvula midline , no oropharyngeal erythema / exudate    Neck : supple , negative JVD , negative carotid bruits , no thyromegaly    Chest : CTA bilaterally , neg wheeze / rhonchi / rales / crackles / egophany    Cardiovascular : regular rate and rhythm , neg murmurs / rubs / gallops    Abdomen : soft , non distended , non tender to palpation in all 4 quadrants ,  normal bowel sounds     Extremities : WWP , neg cyanosis /clubbing / edema      Neurologic Exam :     Alert and oriented  x 3        Motor Exam:                Right UE:                     no focal weakness ,  > 3+/5 throughout     Left UE:                       no focal weakness ,  > 3+/5 throughout          Right LE:          no focal weakness ,  > 3+/5 throughout          Left LE:          no focal weakness ,  > 3+/5 throughout           Sensation :         intact to light touch x 4 extremities                                DTR :           biceps/brachioradialis : equal                            patella/ankle : equal            Gait :  not tested          PM&R Impression : admitted for AMS    - no acute pathology on CT brain imaging     - deconditioned    - no focal weakness           Recommendations / Plan :       1) Physical / Occupational therapy focusing on therapeutic exercises , equipment evaluation , bed mobility/transfer out of bed evaluation , progressive ambulation with assistive devices prn .    2) Current disposition plan recommendation  :    pending functional progress         Patient is a 89y old  Female who presents with a chief complaint of SOB (2024 07:21)      HPI:  89-year-old Portuguese speaking female w/ a PMHx of HTN, HLD, "minor" CVA 15 years ago, hypothyroidism after thyroidectomy, presents to [facility] for AMS. Daughter (Nora) at bedside to assist with collateral. Patient was planning to go to [place] today to stay with son for a few days. On the car ride over to [place] to  a family member, patient rolled her eyes, started moaning and inability to speak. They brought her to [facility] and she was found to have a BP 60/40 in triage with inability to follow commands. A stroke code was called, which was unremarkable for an acute event. Patient was given empiric antibiotics and IVF. Soon after, patient returned to her baseline mentation. Per daughter, patient does not have history of seizure or cardiac history. Prior to the incident, patient endorsed feeling in her usual state of health. Patient denies any recent sick contacts, fevers, chills, nausea, vomiting, headache, acute sob, chest pain, abdominal pain, genitourinary sx, extremity pain or swelling.     ED Course:  Vitals: 101F, , /89, RR 16(94%) on Room air  Imaging: CTH - no acute hemorrhage, mod chronic microvascular ischemic changes. CT perfusion normal  Consults: None  Interventions: CTX 1g, Vancomycin 1g, Tylenol 1g, 2L NS, Ibuprofen 800mg    (2024 04:18)    PAST MEDICAL & SURGICAL HISTORY:  Hypothyroidism      CVA (cerebrovascular accident)      HTN (hypertension)      No significant past surgical history        MEDICATIONS  (STANDING):  atorvastatin 40 milliGRAM(s) Oral at bedtime  cefTRIAXone   IVPB 2000 milliGRAM(s) IV Intermittent every 24 hours  heparin   Injectable 5000 Unit(s) SubCutaneous every 8 hours  influenza  Vaccine (HIGH DOSE) 0.7 milliLiter(s) IntraMuscular once  levothyroxine 75 MICROGram(s) Oral daily  metoprolol succinate ER 25 milliGRAM(s) Oral daily    MEDICATIONS  (PRN):  acetaminophen     Tablet .. 650 milliGRAM(s) Oral every 6 hours PRN Temp greater or equal to 38C (100.4F), Mild Pain (1 - 3)  aluminum hydroxide/magnesium hydroxide/simethicone Suspension 30 milliLiter(s) Oral every 4 hours PRN Dyspepsia  melatonin 3 milliGRAM(s) Oral at bedtime PRN Insomnia  ondansetron Injectable 4 milliGRAM(s) IV Push every 8 hours PRN Nausea and/or Vomiting          Home Living Status :  lives with her daughter in a 1st floor apartment          -  Prior Home Care Services :  none       Baseline Functional Level Prior to Admission :             - ADL's/ IADL's :  independent         - Ambulatory status prior to admission :   walked with no assistive devices          FAMILY HISTORY:  No pertinent family history in first degree relatives        CBC Full  -  ( 2024 05:30 )  WBC Count : 12.55 K/uL  RBC Count : 3.45 M/uL  Hemoglobin : 10.7 g/dL  Hematocrit : 33.5 %  Platelet Count - Automated : 278 K/uL  Mean Cell Volume : 97.1 fl  Mean Cell Hemoglobin : 31.0 pg  Mean Cell Hemoglobin Concentration : 31.9 gm/dL  Auto Neutrophil # : 9.14 K/uL  Auto Lymphocyte # : 1.99 K/uL  Auto Monocyte # : 1.10 K/uL  Auto Eosinophil # : 0.20 K/uL  Auto Basophil # : 0.07 K/uL  Auto Neutrophil % : 72.7 %  Auto Lymphocyte % : 15.9 %  Auto Monocyte % : 8.8 %  Auto Eosinophil % : 1.6 %  Auto Basophil % : 0.6 %      -03    143  |  108  |  26<H>  ----------------------------<  96  3.6   |  23  |  1.39<H>    Ca    8.4      2024 05:52  Phos  3.7     01-  Mg     1.6     -    TPro  7.3  /  Alb  3.5  /  TBili  0.6  /  DBili  x   /  AST  47<H>  /  ALT  38  /  AlkPhos  62  01-02      Urinalysis Basic - ( 2024 19:53 )    Color: Yellow / Appearance: Cloudy / S.023 / pH: x  Gluc: x / Ketone: Negative mg/dL  / Bili: Negative / Urobili: 0.2 mg/dL   Blood: x / Protein: 30 mg/dL / Nitrite: Negative   Leuk Esterase: Small / RBC: 214 /HPF / WBC 15 /HPF   Sq Epi: x / Non Sq Epi: 7 /HPF / Bacteria: Few /HPF        Radiology :     < from: Xray Chest 1 View- PORTABLE-Urgent (Xray Chest 1 View- PORTABLE-Urgent .) (24 @ 19:21) >  ACC: [id number] EXAM:  XR CHEST PORTABLE URGENT 1V   ORDERED BY: KALEN JIM     PROCEDURE DATE:  2024          INTERPRETATION:  HISTORY: Possible aspiration PNA    TECHNIQUE: One view of the chest    COMPARISON: Chest one view 1/3/2024.    FINDINGS/  IMPRESSION:    Lines/devices: None.    Lungs/pleura: No pneumothorax. No pleural effusion. Continued suggestion   of asymmetric opacity over the left lower lung and left lower lobe   pneumonia cannot be excluded. Continued follow-up 2 viewchest   examination is suggested to confirm resolution.    Cardiomediastinal silhouette: Within normal limits.    < from: CT Brain Stroke Protocol (24 @ 14:05) >  ACC: [id number] EXAM:  CT ANGIO BRAIN STROKE PROTC IC   ORDERED BY: NAMRATA CUMMINGS     ACC: [id number] EXAM:  CT ANGIO NECK STROKE PROTCL IC   ORDERED BY: NAMRATA CUMMINGS     ACC: [id number] EXAM:  CT BRAIN STROKE PROTOCOL   ORDERED BY: NAMRATA CUMMINGS     ACC: [id number] EXAM:  CT BRAIN PERFUSION MAPS STROKE   ORDERED BY: NAMRATA CUMMINGS     PROCEDURE DATE:  2024          INTERPRETATION:  PROCEDURES:  CT Head without contrast  CT Perfusion with intravenous contrast.  CTA brain with intravenous contrast.  CTA neck with intravenous contrast.    INDICATION: Stroke code, altered mental status.    TECHNIQUE: CT Head: Multiple contiguous axial CT images of the head were   obtained from the base of the skull to the vertex without the   administration of intravenous contrast. Coronal and sagittal reformatted   images were obtained.  RAPID AI was used for preliminary interpretation.    CT Perfusion: Following the intravenous administration of 45 ml of   Omnipaque-350, serial axial images were obtained through the brain. The   CT perfusion data set was post processed per iSchemaViewRAPID protocol   generating color maps of CBF, CBV, MTT, and Tmax.    CTA Head and Neck: Multiple axial thin section were obtained from the   aortic arch through the neck and intracranial compartment up to the   calvarial vertex. Imaging is done after the IV bolus of 80 mL Omnipaque   350. MIP series are provided.    COMPARISON: None    FINDINGS:    CT Head:    The ventricles and sulci are prominent consistent with parenchymal volume   loss.    No evidence for intracranial hemorrhage, significant space occupying   process or recent territorial infarction.  No acute extra-axial fluid   collections are identified.    Gray-white matter differentiation is preserved. There are patchy foci of   periventricular and subcortical hypodensities consistent with moderate   chronic microvascular ischemic disease. .    The bones of the calvarium are intact.    There is fluid within the right maxillary sinus. The mastoid air cells   are well-aerated..    ***************************************************************************  ****************************  CT Perfusion:    The CBF <30% volume is 0 mL.  The Tmax > 6s volume is 0 mL.  The mismatch   volume is 0 mL.    ***************************************************************************  ****************************************    CTA Head and Neck:    INTRACRANIAL:  The internal carotid arteries are patent at the skull base and   intracranial compartment without occlusion or high grade stenosis. The   anterior and middle cerebral arteries are patent at their 1st and 2nd   order segments, and appear symmetric caliber.     The intracranial vertebral arteries, the basilar artery andboth   posterior cerebral arteries are patent. The left P1 segment is   hypoplastic with a prominent left posterior communicating artery. There   is irregularity with mild to moderate stenosis the distal right PCA.    There is no site of aneurysm within the resolution limitations of CTA.    EXTRACRANIAL:    The aortic arch is normal size and shows patency, with standard 3 vessel   configuration. No significant great vessel stenosis.    Both common carotid arteries are patent to the bifurcations.    There is predominantly calcified plaque of the proximal left internal   carotid artery resulting in mild stenosis per NASCET criteria  There is mixed calcified and noncalcified plaque of the proximal right   internal carotid artery resulting in mild stenosis per NASCET criteria.    Vertebral arteries are patent at their origins and throughout their   course in the neck.    Thyroid gland is not visualized.    IMPRESSION:    CT Head: No acute intracranial hemorrhage, mass effect or large   demarcated territorial infarction. Moderate chronic microvascular   ischemic changes are noted.    The last image was acquired on 2024 2:01 PM and the findings were   discussed with ADAMS Cummings by Dr. Cristian Menjivar on 2024 2:05 PM    CT Perfusion: Normal CT perfusion.    Intracranial CTA: No large vessel occlusion.  Mild to moderate stenosis of the distal right PCA.    Extracranial CTA: Mild stenosis the proximal bilateral internal carotid   arteries.    < end of copied text >          Review of Systems : per HPI         Vital Signs Last 24 Hrs  T(C): 36.6 (2024 05:08), Max: 38.6 (2024 16:43)  T(F): 97.9 (2024 05:08), Max: 101.5 (2024 16:43)  HR: 77 (2024 05:08) (77 - 97)  BP: 126/66 (2024 05:08) (94/55 - 126/66)  BP(mean): --  RR: 17 (2024 05:08) (17 - 18)  SpO2: 95% (2024 05:08) (93% - 95%)    Parameters below as of 2024 05:08  Patient On (Oxygen Delivery Method): room air            Physical Exam :  89 y o woman lying comfortably in semi Dinh's position , awake , alert , no acute complaints     Head : normocephalic , atraumatic    Eyes : PERRLA , EOMI , no nystagmus , sclera anicteric    ENT / FACE : neg nasal discharge , uvula midline , no oropharyngeal erythema / exudate    Neck : supple , negative JVD , negative carotid bruits , no thyromegaly    Chest : CTA bilaterally , neg wheeze / rhonchi / rales / crackles / egophany    Cardiovascular : regular rate and rhythm , neg murmurs / rubs / gallops    Abdomen : soft , non distended , non tender to palpation in all 4 quadrants ,  normal bowel sounds     Extremities : WWP , neg cyanosis /clubbing / edema      Neurologic Exam :     Alert and oriented  x 3        Motor Exam:                Right UE:                     no focal weakness ,  > 3+/5 throughout     Left UE:                       no focal weakness ,  > 3+/5 throughout          Right LE:          no focal weakness ,  > 3+/5 throughout          Left LE:          no focal weakness ,  > 3+/5 throughout           Sensation :         intact to light touch x 4 extremities                                DTR :           biceps/brachioradialis : equal                            patella/ankle : equal            Gait :  not tested          PM&R Impression : admitted for AMS    - no acute pathology on CT brain imaging     - deconditioned    - no focal weakness           Recommendations / Plan :       1) Physical / Occupational therapy focusing on therapeutic exercises , equipment evaluation , bed mobility/transfer out of bed evaluation , progressive ambulation with assistive devices prn .    2) Current disposition plan recommendation  :    pending functional progress

## 2024-01-04 NOTE — DISCHARGE NOTE PROVIDER - HOSPITAL COURSE
#Discharge: do not delete    89F w/ PMHx of HTN, HLD, "minor" CVA 15 years ago, hypothyroidism after thyroidectomy, presents to [facility] for AMS.      # Severe sepsis.   RESOLVED  On admission met 3/4 SIRS criteria with 101F, , WBC 15. Likely 2/2 non-COVID SARS vs aspiration PNA vs. less likely UTI. Lactate 3.1, repeat 1.7. CXR unremarkable. U/A negative. S/p Vanc 1g, CTX 1g, 2L NS.  BCx no growth at 24h    # Metabolic encephalopathy.   Patient became acutely altered in the car today while driving from [facility] to [facility]. Eyes rolled back. No fecal or urinary incontinence. No history of seizures. Remote history of CVA. Likely 2/2 TIA vs sepsis from non-COVID SARs vs. HTN. Stroke code in [facility] unremarkable.   Patient is currently AAOx3 without focal deficits, back at baseline  Utox (-), TSH 1.92 (on synthoid at home)  Echo: The left ventricle is normal in size, wall thickness, and systolic function with a calculated ejection fraction of 60-65%  VEEG showed no evidence of seizure    # ROHIT (acute kidney injury).   RESOLVED  On admission Cr: 1.87 (unknown baseline). Likely 2/2 prerenal 2/2 hypovolemia.     # Type 2 myocardial infarction.   Trop I 103. . Likely elevated 2/2 demand ischemia from sepsis. Patient currently denies any chest discomfort, acute SOB, extremity pain. [facility] ED spoke to Cardiology, who evaluated EKG and stated likely 2/2 demand ischemia.  ECG showed LLLB age not determined  Trop: 18 -> 105 -> 74  - Cardiology recs:  - follow up with outpatient cardiologst Alvin     # Hypothyroidism.   TSH 1.92   - c/w home synthroid 75mcg.    # CVA (cerebrovascular accident).   Remote hx of CVA 15 years ago. On Lipitor 40mg Stroke code negative for acute event in ED  - Stroke consult in AM, appreciate recs  - C/w Lipitor 40mg.  - c/w aspirin    # HTN (hypertension).   On admission, presented with /89. On home Lisinopril 20-HCTZ 12.5mg qd and Toprol 25mg  - resume home meds on discharge      Patient was discharged to: home    New medications: cefpodoxime   Changes to old medications: none  Medications that were stopped: none    Items to follow up as outpatient: PCP, cardiology    Physical exam at the time of discharge:       #Discharge: do not delete    89F w/ PMHx of HTN, HLD, "minor" CVA 15 years ago, hypothyroidism after thyroidectomy, presents to [facility] for AMS.      # Severe sepsis.   RESOLVED  On admission met 3/4 SIRS criteria with 101F, , WBC 15. Likely 2/2 non-COVID SARS vs aspiration PNA vs. less likely UTI. Lactate 3.1, repeat 1.7. CXR unremarkable. U/A negative. S/p Vanc 1g, CTX 1g, 2L NS.  BCx no growth at 24h    # Metabolic encephalopathy.   Patient became acutely altered in the car today while driving from [facility] to [facility]. Eyes rolled back. No fecal or urinary incontinence. No history of seizures. Remote history of CVA. Likely 2/2 TIA vs sepsis from non-COVID SARs vs. HTN. Stroke code in [facility] unremarkable.   Patient is currently AAOx3 without focal deficits, back at baseline  Utox (-), TSH 1.92 (on synthoid at home)  Echo: The left ventricle is normal in size, wall thickness, and systolic function with a calculated ejection fraction of 60-65%  VEEG showed no evidence of seizure    # ROHIT (acute kidney injury).   RESOLVED  On admission Cr: 1.87 (unknown baseline). Likely 2/2 prerenal 2/2 hypovolemia.     # Type 2 myocardial infarction.   Trop I 103. . Likely elevated 2/2 demand ischemia from sepsis. Patient currently denies any chest discomfort, acute SOB, extremity pain. [facility] ED spoke to Cardiology, who evaluated EKG and stated likely 2/2 demand ischemia.  ECG showed LLLB age not determined  Trop: 18 -> 105 -> 74  - Cardiology recs:  - follow up with outpatient cardiologst Alvin     # Hypothyroidism.   TSH 1.92   - c/w home synthroid 75mcg.    # CVA (cerebrovascular accident).   Remote hx of CVA 15 years ago. On Lipitor 40mg Stroke code negative for acute event in ED  - Stroke consult in AM, appreciate recs  - C/w Lipitor 40mg.  - c/w aspirin    # HTN (hypertension).   On admission, presented with /89. On home Lisinopril 20-HCTZ 12.5mg qd and Toprol 25mg  - resume home meds on discharge      Patient was discharged to: home    New medications: cefpodoxime   Changes to old medications: none  Medications that were stopped: none    Items to follow up as outpatient: PCP, cardiology    Physical exam at the time of discharge:       #Discharge: do not delete    89F w/ PMHx of HTN, HLD, "minor" CVA 15 years ago, hypothyroidism after thyroidectomy, presents to [facility] for AMS.      # Severe sepsis.   RESOLVED  On admission met 3/4 SIRS criteria with 101F, , WBC 15. Likely 2/2 non-COVID SARS vs aspiration PNA vs. less likely UTI. Lactate 3.1, repeat 1.7. CXR unremarkable. U/A negative. S/p Vanc 1g, CTX 1g, 2L NS.  BCx no growth at 24h    #Aspiration Pneumonia  C/w treatment as above    # Metabolic encephalopathy.   Patient became acutely altered in the car today while driving from [facility] to [facility]. Eyes rolled back. No fecal or urinary incontinence. No history of seizures. Remote history of CVA. Likely 2/2 TIA vs sepsis from non-COVID SARs vs. HTN. Stroke code in [facility] unremarkable.   Patient is currently AAOx3 without focal deficits, back at baseline  Utox (-), TSH 1.92 (on synthoid at home)  Echo: The left ventricle is normal in size, wall thickness, and systolic function with a calculated ejection fraction of 60-65%  VEEG showed no evidence of seizure    #Problem: ATN (acute tubular necrosis).  ·  Plan: resolving  On admission Cr: 1.87 normal baseline). Likely 2/2 prerenal 2/2 hypovolemia.     U/A with casts      # Type 2 myocardial infarction.   Trop I 103. . Likely elevated 2/2 demand ischemia from sepsis. Patient currently denies any chest discomfort, acute SOB, extremity pain. [facility] ED spoke to Cardiology, who evaluated EKG and stated likely 2/2 demand ischemia.  ECG showed LLLB age not determined  Trop: 18 -> 105 -> 74  - Cardiology recs:  - follow up with outpatient cardiologst Alvin     # Hypothyroidism.   TSH 1.92   - c/w home synthroid 75mcg.    # CVA (cerebrovascular accident).   Remote hx of CVA 15 years ago. On Lipitor 40mg Stroke code negative for acute event in ED  - Stroke consult in AM, appreciate recs  - C/w Lipitor 40mg.  - c/w aspirin    # HTN (hypertension).   On admission, presented with /89. On home Lisinopril 20-HCTZ 12.5mg qd and Toprol 25mg  - resume home meds on discharge      Patient was discharged to: home    New medications: cefpodoxime   Changes to old medications: none  Medications that were stopped: none    Items to follow up as outpatient: PCP, cardiology    Physical exam at the time of discharge:       #Discharge: do not delete    89F w/ PMHx of HTN, HLD, "minor" CVA 15 years ago, hypothyroidism after thyroidectomy, presents to [facility] for AMS.      # Severe sepsis.   RESOLVED  On admission met 3/4 SIRS criteria with 101F, , WBC 15. Likely 2/2 non-COVID SARS vs aspiration PNA vs. less likely UTI. Lactate 3.1, repeat 1.7. CXR unremarkable. U/A negative. S/p Vanc 1g, CTX 1g, 2L NS.  BCx no growth at 24h    #Aspiration Pneumonia  C/w treatment as above    # Metabolic encephalopathy.   Patient became acutely altered in the car today while driving from [facility] to [facility]. Eyes rolled back. No fecal or urinary incontinence. No history of seizures. Remote history of CVA. Likely 2/2 TIA vs sepsis from non-COVID SARs vs. HTN. Stroke code in [facility] unremarkable.   Patient is currently AAOx3 without focal deficits, back at baseline  Utox (-), TSH 1.92 (on synthoid at home)  Echo: The left ventricle is normal in size, wall thickness, and systolic function with a calculated ejection fraction of 60-65%  VEEG showed no evidence of seizure    #Problem: ATN (acute tubular necrosis).  ·  Plan: resolving  On admission Cr: 1.87 normal baseline). Likely 2/2 prerenal 2/2 hypovolemia.     U/A with casts      # Type 2 myocardial infarction.   Trop I 103. . Likely elevated 2/2 demand ischemia from sepsis. Patient currently denies any chest discomfort, acute SOB, extremity pain. [facility] ED spoke to Cardiology, who evaluated EKG and stated likely 2/2 demand ischemia.  ECG showed LLLB age not determined  Trop: 18 -> 105 -> 74  - Cardiology recs:  - follow up with outpatient cardiologst Alvin     # Hypothyroidism.   TSH 1.92   - c/w home synthroid 75mcg.    # CVA (cerebrovascular accident).   Remote hx of CVA 15 years ago. On Lipitor 40mg Stroke code negative for acute event in ED  - Stroke consult in AM, appreciate recs  - C/w Lipitor 40mg.  - c/w aspirin    # HTN (hypertension).   On admission, presented with /89. On home Lisinopril 20-HCTZ 12.5mg qd and Toprol 25mg  - resume home meds on discharge      Patient was discharged to: home    New medications: cefpodoxime   Changes to old medications: none  Medications that were stopped: none    Items to follow up as outpatient: PCP, cardiology    Physical exam at the time of discharge:

## 2024-01-05 VITALS
RESPIRATION RATE: 16 BRPM | DIASTOLIC BLOOD PRESSURE: 68 MMHG | OXYGEN SATURATION: 97 % | TEMPERATURE: 97 F | HEART RATE: 72 BPM | SYSTOLIC BLOOD PRESSURE: 145 MMHG

## 2024-01-05 LAB
AMPHET UR-MCNC: NEGATIVE — SIGNIFICANT CHANGE UP
AMPHET UR-MCNC: NEGATIVE — SIGNIFICANT CHANGE UP
ANION GAP SERPL CALC-SCNC: 9 MMOL/L — SIGNIFICANT CHANGE UP (ref 5–17)
ANION GAP SERPL CALC-SCNC: 9 MMOL/L — SIGNIFICANT CHANGE UP (ref 5–17)
BARBITURATES UR SCN-MCNC: NEGATIVE — SIGNIFICANT CHANGE UP
BARBITURATES UR SCN-MCNC: NEGATIVE — SIGNIFICANT CHANGE UP
BASOPHILS # BLD AUTO: 0.07 K/UL — SIGNIFICANT CHANGE UP (ref 0–0.2)
BASOPHILS # BLD AUTO: 0.07 K/UL — SIGNIFICANT CHANGE UP (ref 0–0.2)
BASOPHILS NFR BLD AUTO: 0.7 % — SIGNIFICANT CHANGE UP (ref 0–2)
BASOPHILS NFR BLD AUTO: 0.7 % — SIGNIFICANT CHANGE UP (ref 0–2)
BENZODIAZ UR-MCNC: NEGATIVE — SIGNIFICANT CHANGE UP
BENZODIAZ UR-MCNC: NEGATIVE — SIGNIFICANT CHANGE UP
BUN SERPL-MCNC: 15 MG/DL — SIGNIFICANT CHANGE UP (ref 7–23)
BUN SERPL-MCNC: 15 MG/DL — SIGNIFICANT CHANGE UP (ref 7–23)
CALCIUM SERPL-MCNC: 8.3 MG/DL — LOW (ref 8.4–10.5)
CALCIUM SERPL-MCNC: 8.3 MG/DL — LOW (ref 8.4–10.5)
CHLORIDE SERPL-SCNC: 107 MMOL/L — SIGNIFICANT CHANGE UP (ref 96–108)
CHLORIDE SERPL-SCNC: 107 MMOL/L — SIGNIFICANT CHANGE UP (ref 96–108)
CO2 SERPL-SCNC: 23 MMOL/L — SIGNIFICANT CHANGE UP (ref 22–31)
CO2 SERPL-SCNC: 23 MMOL/L — SIGNIFICANT CHANGE UP (ref 22–31)
COCAINE METAB.OTHER UR-MCNC: NEGATIVE — SIGNIFICANT CHANGE UP
COCAINE METAB.OTHER UR-MCNC: NEGATIVE — SIGNIFICANT CHANGE UP
CREAT SERPL-MCNC: 0.91 MG/DL — SIGNIFICANT CHANGE UP (ref 0.5–1.3)
CREAT SERPL-MCNC: 0.91 MG/DL — SIGNIFICANT CHANGE UP (ref 0.5–1.3)
CULTURE RESULTS: SIGNIFICANT CHANGE UP
CULTURE RESULTS: SIGNIFICANT CHANGE UP
EGFR: 60 ML/MIN/1.73M2 — SIGNIFICANT CHANGE UP
EGFR: 60 ML/MIN/1.73M2 — SIGNIFICANT CHANGE UP
EOSINOPHIL # BLD AUTO: 0.33 K/UL — SIGNIFICANT CHANGE UP (ref 0–0.5)
EOSINOPHIL # BLD AUTO: 0.33 K/UL — SIGNIFICANT CHANGE UP (ref 0–0.5)
EOSINOPHIL NFR BLD AUTO: 3.3 % — SIGNIFICANT CHANGE UP (ref 0–6)
EOSINOPHIL NFR BLD AUTO: 3.3 % — SIGNIFICANT CHANGE UP (ref 0–6)
GLUCOSE SERPL-MCNC: 88 MG/DL — SIGNIFICANT CHANGE UP (ref 70–99)
GLUCOSE SERPL-MCNC: 88 MG/DL — SIGNIFICANT CHANGE UP (ref 70–99)
HCT VFR BLD CALC: 34.5 % — SIGNIFICANT CHANGE UP (ref 34.5–45)
HCT VFR BLD CALC: 34.5 % — SIGNIFICANT CHANGE UP (ref 34.5–45)
HGB BLD-MCNC: 11.3 G/DL — LOW (ref 11.5–15.5)
HGB BLD-MCNC: 11.3 G/DL — LOW (ref 11.5–15.5)
IMM GRANULOCYTES NFR BLD AUTO: 0.4 % — SIGNIFICANT CHANGE UP (ref 0–0.9)
IMM GRANULOCYTES NFR BLD AUTO: 0.4 % — SIGNIFICANT CHANGE UP (ref 0–0.9)
LYMPHOCYTES # BLD AUTO: 2.05 K/UL — SIGNIFICANT CHANGE UP (ref 1–3.3)
LYMPHOCYTES # BLD AUTO: 2.05 K/UL — SIGNIFICANT CHANGE UP (ref 1–3.3)
LYMPHOCYTES # BLD AUTO: 20.4 % — SIGNIFICANT CHANGE UP (ref 13–44)
LYMPHOCYTES # BLD AUTO: 20.4 % — SIGNIFICANT CHANGE UP (ref 13–44)
MAGNESIUM SERPL-MCNC: 1.9 MG/DL — SIGNIFICANT CHANGE UP (ref 1.6–2.6)
MAGNESIUM SERPL-MCNC: 1.9 MG/DL — SIGNIFICANT CHANGE UP (ref 1.6–2.6)
MCHC RBC-ENTMCNC: 31.3 PG — SIGNIFICANT CHANGE UP (ref 27–34)
MCHC RBC-ENTMCNC: 31.3 PG — SIGNIFICANT CHANGE UP (ref 27–34)
MCHC RBC-ENTMCNC: 32.8 GM/DL — SIGNIFICANT CHANGE UP (ref 32–36)
MCHC RBC-ENTMCNC: 32.8 GM/DL — SIGNIFICANT CHANGE UP (ref 32–36)
MCV RBC AUTO: 95.6 FL — SIGNIFICANT CHANGE UP (ref 80–100)
MCV RBC AUTO: 95.6 FL — SIGNIFICANT CHANGE UP (ref 80–100)
METHADONE UR-MCNC: NEGATIVE — SIGNIFICANT CHANGE UP
METHADONE UR-MCNC: NEGATIVE — SIGNIFICANT CHANGE UP
MONOCYTES # BLD AUTO: 1.09 K/UL — HIGH (ref 0–0.9)
MONOCYTES # BLD AUTO: 1.09 K/UL — HIGH (ref 0–0.9)
MONOCYTES NFR BLD AUTO: 10.8 % — SIGNIFICANT CHANGE UP (ref 2–14)
MONOCYTES NFR BLD AUTO: 10.8 % — SIGNIFICANT CHANGE UP (ref 2–14)
NEUTROPHILS # BLD AUTO: 6.47 K/UL — SIGNIFICANT CHANGE UP (ref 1.8–7.4)
NEUTROPHILS # BLD AUTO: 6.47 K/UL — SIGNIFICANT CHANGE UP (ref 1.8–7.4)
NEUTROPHILS NFR BLD AUTO: 64.4 % — SIGNIFICANT CHANGE UP (ref 43–77)
NEUTROPHILS NFR BLD AUTO: 64.4 % — SIGNIFICANT CHANGE UP (ref 43–77)
NRBC # BLD: 0 /100 WBCS — SIGNIFICANT CHANGE UP (ref 0–0)
NRBC # BLD: 0 /100 WBCS — SIGNIFICANT CHANGE UP (ref 0–0)
OPIATES UR-MCNC: NEGATIVE — SIGNIFICANT CHANGE UP
OPIATES UR-MCNC: NEGATIVE — SIGNIFICANT CHANGE UP
PCP SPEC-MCNC: SIGNIFICANT CHANGE UP
PCP SPEC-MCNC: SIGNIFICANT CHANGE UP
PCP UR-MCNC: NEGATIVE — SIGNIFICANT CHANGE UP
PCP UR-MCNC: NEGATIVE — SIGNIFICANT CHANGE UP
PHOSPHATE SERPL-MCNC: 2.6 MG/DL — SIGNIFICANT CHANGE UP (ref 2.5–4.5)
PHOSPHATE SERPL-MCNC: 2.6 MG/DL — SIGNIFICANT CHANGE UP (ref 2.5–4.5)
PLATELET # BLD AUTO: 317 K/UL — SIGNIFICANT CHANGE UP (ref 150–400)
PLATELET # BLD AUTO: 317 K/UL — SIGNIFICANT CHANGE UP (ref 150–400)
POTASSIUM SERPL-MCNC: 4 MMOL/L — SIGNIFICANT CHANGE UP (ref 3.5–5.3)
POTASSIUM SERPL-MCNC: 4 MMOL/L — SIGNIFICANT CHANGE UP (ref 3.5–5.3)
POTASSIUM SERPL-SCNC: 4 MMOL/L — SIGNIFICANT CHANGE UP (ref 3.5–5.3)
POTASSIUM SERPL-SCNC: 4 MMOL/L — SIGNIFICANT CHANGE UP (ref 3.5–5.3)
RBC # BLD: 3.61 M/UL — LOW (ref 3.8–5.2)
RBC # BLD: 3.61 M/UL — LOW (ref 3.8–5.2)
RBC # FLD: 14.3 % — SIGNIFICANT CHANGE UP (ref 10.3–14.5)
RBC # FLD: 14.3 % — SIGNIFICANT CHANGE UP (ref 10.3–14.5)
S PNEUM AG UR QL: NEGATIVE — SIGNIFICANT CHANGE UP
S PNEUM AG UR QL: NEGATIVE — SIGNIFICANT CHANGE UP
SODIUM SERPL-SCNC: 139 MMOL/L — SIGNIFICANT CHANGE UP (ref 135–145)
SODIUM SERPL-SCNC: 139 MMOL/L — SIGNIFICANT CHANGE UP (ref 135–145)
SPECIMEN SOURCE: SIGNIFICANT CHANGE UP
SPECIMEN SOURCE: SIGNIFICANT CHANGE UP
THC UR QL: NEGATIVE — SIGNIFICANT CHANGE UP
THC UR QL: NEGATIVE — SIGNIFICANT CHANGE UP
WBC # BLD: 10.05 K/UL — SIGNIFICANT CHANGE UP (ref 3.8–10.5)
WBC # BLD: 10.05 K/UL — SIGNIFICANT CHANGE UP (ref 3.8–10.5)
WBC # FLD AUTO: 10.05 K/UL — SIGNIFICANT CHANGE UP (ref 3.8–10.5)
WBC # FLD AUTO: 10.05 K/UL — SIGNIFICANT CHANGE UP (ref 3.8–10.5)

## 2024-01-05 PROCEDURE — 80048 BASIC METABOLIC PNL TOTAL CA: CPT

## 2024-01-05 PROCEDURE — 82553 CREATINE MB FRACTION: CPT

## 2024-01-05 PROCEDURE — 84145 PROCALCITONIN (PCT): CPT

## 2024-01-05 PROCEDURE — 84443 ASSAY THYROID STIM HORMONE: CPT

## 2024-01-05 PROCEDURE — 0042T: CPT

## 2024-01-05 PROCEDURE — 71046 X-RAY EXAM CHEST 2 VIEWS: CPT

## 2024-01-05 PROCEDURE — 84484 ASSAY OF TROPONIN QUANT: CPT

## 2024-01-05 PROCEDURE — 95716 VEEG EA 12-26HR CONT MNTR: CPT

## 2024-01-05 PROCEDURE — 85610 PROTHROMBIN TIME: CPT

## 2024-01-05 PROCEDURE — 85025 COMPLETE CBC W/AUTO DIFF WBC: CPT

## 2024-01-05 PROCEDURE — 71045 X-RAY EXAM CHEST 1 VIEW: CPT

## 2024-01-05 PROCEDURE — 99285 EMERGENCY DEPT VISIT HI MDM: CPT

## 2024-01-05 PROCEDURE — 80053 COMPREHEN METABOLIC PANEL: CPT

## 2024-01-05 PROCEDURE — 70496 CT ANGIOGRAPHY HEAD: CPT

## 2024-01-05 PROCEDURE — 70450 CT HEAD/BRAIN W/O DYE: CPT

## 2024-01-05 PROCEDURE — 85730 THROMBOPLASTIN TIME PARTIAL: CPT

## 2024-01-05 PROCEDURE — 86901 BLOOD TYPING SEROLOGIC RH(D): CPT

## 2024-01-05 PROCEDURE — 99239 HOSP IP/OBS DSCHRG MGMT >30: CPT | Mod: GC

## 2024-01-05 PROCEDURE — 87637 SARSCOV2&INF A&B&RSV AMP PRB: CPT

## 2024-01-05 PROCEDURE — 87899 AGENT NOS ASSAY W/OPTIC: CPT

## 2024-01-05 PROCEDURE — 93306 TTE W/DOPPLER COMPLETE: CPT

## 2024-01-05 PROCEDURE — 0225U NFCT DS DNA&RNA 21 SARSCOV2: CPT

## 2024-01-05 PROCEDURE — 82550 ASSAY OF CK (CPK): CPT

## 2024-01-05 PROCEDURE — 83605 ASSAY OF LACTIC ACID: CPT

## 2024-01-05 PROCEDURE — 87640 STAPH A DNA AMP PROBE: CPT

## 2024-01-05 PROCEDURE — 70498 CT ANGIOGRAPHY NECK: CPT

## 2024-01-05 PROCEDURE — 87040 BLOOD CULTURE FOR BACTERIA: CPT

## 2024-01-05 PROCEDURE — 95700 EEG CONT REC W/VID EEG TECH: CPT

## 2024-01-05 PROCEDURE — 82962 GLUCOSE BLOOD TEST: CPT

## 2024-01-05 PROCEDURE — 83735 ASSAY OF MAGNESIUM: CPT

## 2024-01-05 PROCEDURE — 99232 SBSQ HOSP IP/OBS MODERATE 35: CPT

## 2024-01-05 PROCEDURE — 84100 ASSAY OF PHOSPHORUS: CPT

## 2024-01-05 PROCEDURE — 86900 BLOOD TYPING SEROLOGIC ABO: CPT

## 2024-01-05 PROCEDURE — 36415 COLL VENOUS BLD VENIPUNCTURE: CPT

## 2024-01-05 PROCEDURE — 81001 URINALYSIS AUTO W/SCOPE: CPT

## 2024-01-05 PROCEDURE — 97161 PT EVAL LOW COMPLEX 20 MIN: CPT

## 2024-01-05 PROCEDURE — 86850 RBC ANTIBODY SCREEN: CPT

## 2024-01-05 PROCEDURE — 87086 URINE CULTURE/COLONY COUNT: CPT

## 2024-01-05 PROCEDURE — 80307 DRUG TEST PRSMV CHEM ANLYZR: CPT

## 2024-01-05 PROCEDURE — 87641 MR-STAPH DNA AMP PROBE: CPT

## 2024-01-05 RX ORDER — SODIUM,POTASSIUM PHOSPHATES 278-250MG
2 POWDER IN PACKET (EA) ORAL ONCE
Refills: 0 | Status: COMPLETED | OUTPATIENT
Start: 2024-01-05 | End: 2024-01-05

## 2024-01-05 RX ADMIN — Medication 75 MICROGRAM(S): at 05:59

## 2024-01-05 RX ADMIN — HEPARIN SODIUM 5000 UNIT(S): 5000 INJECTION INTRAVENOUS; SUBCUTANEOUS at 05:59

## 2024-01-05 RX ADMIN — CEFTRIAXONE 100 MILLIGRAM(S): 500 INJECTION, POWDER, FOR SOLUTION INTRAMUSCULAR; INTRAVENOUS at 05:59

## 2024-01-05 RX ADMIN — Medication 2 PACKET(S): at 10:31

## 2024-01-05 RX ADMIN — Medication 25 MILLIGRAM(S): at 05:59

## 2024-01-05 NOTE — DISCHARGE NOTE NURSING/CASE MANAGEMENT/SOCIAL WORK - NSDCPETBCESMAN_GEN_ALL_CORE
If you are a smoker, it is important for your health to stop smoking. Please be aware that second hand smoke is also harmful.
Never smoker

## 2024-01-05 NOTE — PROGRESS NOTE ADULT - REASON FOR ADMISSION
Pt notified of preop MRSA negative, staph positive and need for treatment. Rx instructions reviewed with patient; voiced understanding. RX sent to pharmacy of choice.    
SOB
SOB
AMS
SOB

## 2024-01-05 NOTE — DISCHARGE NOTE NURSING/CASE MANAGEMENT/SOCIAL WORK - NSDCPEFALRISK_GEN_ALL_CORE
For information on Fall & Injury Prevention, visit: [email or web address] OR  [email or web address] OR  https://www.cdc.gov/steadi/patient.html For information on Fall & Injury Prevention, visit: [email or web address] OR  [email or web address] OR  https://www.cdc.gov/steadi/patient.html

## 2024-01-05 NOTE — DISCHARGE NOTE NURSING/CASE MANAGEMENT/SOCIAL WORK - PATIENT PORTAL LINK FT
You can access the FollowMyHealth Patient Portal offered by [facility] by registering at the following website: [email or web address]. By joining [employer]’s FollowMyHealth portal, you will also be able to view your health information using other applications (apps) compatible with our system. You can access the FollowMyHealth Patient Portal offered by [facility] by registering at the following website: [email or web address]. By joining [employer]’s FollowMyHealth portal, you will also be able to view your health information using other applications (apps) compatible with our system.

## 2024-01-05 NOTE — PROGRESS NOTE ADULT - ASSESSMENT
{\rtf1\ntdlda11693\ansi\qaoktdc0468\ftnbj\uc1\deff0  {\fonttbl{\f0 \fnil Segoe UI;}{\f1 \fnil \fcharset0 Segoe UI;}{\f2 \fnil Times New Rahul;}}  {\colortbl ;\ivo090\onmcu712\digl256 ;\red0\green0\blue0 ;\red0\green0\hhsv196 ;\red0\green0\blue0 ;}  {\stylesheet{\f0\fs20 Normal;}{\cs1 Default Paragraph Font;}{\cs2\f0\fs16 Line Number;}{\cs3\f2\fs24\ul\cf3 Hyperlink;}}  {\*\revtbl{Unknown;}}  \jlxmmn36579\awprng06248\bsixq1490\rpmhr4721\pltvc2831\cjmvc7072\fytvcww629\agawkeo202\nogrowautofit\soijij598\formshade\nofeaturethrottle1\dntblnsbdb\fet4\aendnotes\aftnnrlc\pgbrdrhead\pgbrdrfoot  \sectd\mlyyqn66677\bnjbgb10563\guttersxn0\brdkdvgu9248\aootfajb9711\cekmgcnl8500\zwhsofff8841\kxwhibr987\fohbyte905\sbkpage\pgncont\pgndec  \plain\plain\f0\fs24\ql\plain\f0\fs24\plain\f0\fs20\yvlk4188\hich\f0\dbch\f0\loch\f0\fs20\par  I M\par  \par  89 y o F w/ PMHx of HTN, HLD, "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 \par  \'b7  {\*\bkmkstart dh52783561416}{\*\bkmkend rx98566535275}Plan: {\*\bkmkstart go66750483630}{\*\bkmkend yp64744929894}On admission met 3/4 SIRS criteria with 101F, , WBC 15. Likely 2/2 non-COVID SARS vs aspiration PNA vs. less likely UTI. Lactate   3.1, repeat 1.7. CXR unremarkable. U/A negative. S/p Vanc 1g, CTX 1g, 2L NS.\par  BCx no growth 24h\par  \par  - C/w CTX 2g qd \par  - Reperfusion assessment completed.\par  \par  \plain\f1\fs20\vths4968\hich\f1\dbch\f1\loch\f1\cf2\fs20\ul{\field{\*\fldinst HYPERLINK [id number] }{\fldrslt Problem/Plan - 2:}}\plain\f0\fs20\fbzm1183\hich\f0\dbch\f0\loch\f0\fs20\ql\par  \'b7  {\*\bkmkstart vt43274209697}{\*\bkmkend ke82099983014}Problem: {\*\bkmkstart pk60218934494}{\*\bkmkend ry31926793600}Metabolic encephalopathy. \par  \'b7  {\*\bkmkstart tv07813141176}{\*\bkmkend jt56793291708}Plan: {\*\bkmkstart yc16784454278}{\*\bkmkend ir87426509622}Patient became acutely altered in the car today while driving from [facility] to [facility]. Eyes rolled back. No fecal or urinary incontinence.   No history of seizures. Remote history of CVA. Likely 2/2 TIA vs sepsis from non-COVID SARs vs. HTN. Stroke code in GV unremarkable. \par  Patient is currently AAOx3 without focal deficits, back at baseline\par  Utox (-), TSH 1.92 (on synthoid at home)\par  Echo: The left ventricle is normal in size, wall thickness, and systolic function with a calculated ejection fraction of 60-65%\par  \par  - PT consulted\par  - fall precautions\par  - f/u vEEG placement to rule out seizure.\par  - Remaining as above.\par  \par  \plain\f1\fs20\ekgk5572\hich\f1\dbch\f1\loch\f1\cf2\fs20\ul{\field{\*\fldinst HYPERLINK [id number] }{\fldrslt Problem/Plan - 3:}}\plain\f0\fs20\uxgi1446\hich\f0\dbch\f0\loch\f0\fs20\ql\par  \'b7  {\*\bkmkstart dw33274815964}{\*\bkmkend na30951653870}Problem: {\*\bkmkstart ji39806681759}{\*\bkmkend px18110517302}ATN (acute tubular necrosis).\plain\f1\fs20\urli8732\hich\f1\dbch\f1\loch\f1\cf2\fs20\strike\plain\f0\fs20\edew0085\hich\f0\dbch\f0\loch\f0\fs20\par  \'b7  {\*\bkmkstart wy33179125723}{\*\bkmkend xl32349332805}Plan: {\*\bkmkstart no47453999838}{\*\bkmkend oq64237165072}resolving\par  On admission Cr: 1.87 normal baseline). Likely 2/2 prerenal 2/2 hypovolemia. \par  \par  U/A with casts\par  \par  - Bladder scan q6h to rule out obstruction\par  - Strict I/Os\par  - Continue to trend Cr\par  - Avoid nephrotoxic agents\par  - Adjust medication dosages for level of renal function.\plain\f1\fs20\owld4042\hich\f1\dbch\f1\loch\f1\cf2\fs20\strike\plain\f0\fs20\jvvd4930\hich\f0\dbch\f0\loch\f0\fs20\par  \par  \plain\f1\fs20\ooyy3134\hich\f1\dbch\f1\loch\f1\cf2\fs20\ul{\field{\*\fldinst HYPERLINK [id number] }{\fldrslt Problem/Plan - 4:}}\plain\f0\fs20\ftti8435\hich\f0\dbch\f0\loch\f0\fs20\ql\par  \'b7  {\*\bkmkstart bu45578014720}{\*\bkmkend dk74688666728}Problem: {\*\bkmkstart nt65801066984}{\*\bkmkend ss35548428014}Type 2 myocardial infarction. \par  \'b7  {\*\bkmkstart jg28105966132}{\*\bkmkend yp14396070998}Plan: {\*\bkmkstart ps99248756012}{\*\bkmkend yl11262022745}Trop I 103. . Likely elevated 2/2 demand ischemia from sepsis. Patient currently denies any chest discomfort, acute SOB, extremity   pain. [facility] ED spoke to Cardiology, who evaluated EKG and stated likely 2/2 demand ischemia.\par  ECG showed LLLB age not determined\par  Trop: 18 -> 105 -> 74\par  \par  - f/u Cardiology recs.\par  \par  \plain\f1\fs20\qjyk0480\hich\f1\dbch\f1\loch\f1\cf2\fs20\ul{\field{\*\fldinst HYPERLINK [id number] }{\fldrslt Problem/Plan - 5:}}\plain\f0\fs20\rina5854\hich\f0\dbch\f0\loch\f0\fs20\ql\par  \'b7  {\*\bkmkstart cj28416478140}{\*\bkmkend ww84043279782}Problem: {\*\bkmkstart hy22863583011}{\*\bkmkend xx21300073418}Hypothyroidism. \par  \'b7  {\*\bkmkstart iu14680964782}{\*\bkmkend ej04971551963}Plan: {\*\bkmkstart ha07637218415}{\*\bkmkend tu08882781550}TSH 1.92 \par  - c/w home synthroid 75mcg.\par  \par  \plain\f1\fs20\ihiv7694\hich\f1\dbch\f1\loch\f1\cf2\fs20\ul{\field{\*\fldinst HYPERLINK [id number] }{\fldrslt Problem/Plan - 6:}}\plain\f0\fs20\mjip8933\hich\f0\dbch\f0\loch\f0\fs20\ql\par  \'b7  {\*\bkmkstart ov79176915561}{\*\bkmkend et87887652282}Problem: {\*\bkmkstart zx59627118337}{\*\bkmkend ni32672109075}CVA (cerebrovascular accident). \par  \'b7  {\*\bkmkstart dd79203882876}{\*\bkmkend hg55281747390}Plan: {\*\bkmkstart qi22722133368}{\*\bkmkend em57999341949}Remote hx of CVA 15 years ago. On Lipitor 40mg Stroke code negative for acute event in ED\par  - Stroke consult in AM, appreciate recs\par  - C/w Lipitor 40mg.\par  \par  \plain\f1\fs20\fiqa1099\hich\f1\dbch\f1\loch\f1\cf2\fs20\ul{\field{\*\fldinst HYPERLINK [id number] }{\fldrslt Problem/Plan - 7:}}\plain\f0\fs20\whpy6561\hich\f0\dbch\f0\loch\f0\fs20\ql\par  \'b7  {\*\bkmkstart ln33918335693}{\*\bkmkend zo43345099633}Problem: {\*\bkmkstart pi61014250614}{\*\bkmkend rg71540195175}HTN (hypertension). \par  \'b7  {\*\bkmkstart wx61398355792}{\*\bkmkend gt99695529171}Plan: {\*\bkmkstart pk98272850166}{\*\bkmkend oc09613307199}On admission, presented with /89. On home Lisinopril 20-HCTZ 12.5mg qd and Toprol 25mg\par  - c/w home Toprol 25mg\par  - Hold Lisinopril 20-HCTZ 12.5mg qd i/s/o normotension.\par  \par  \plain\f1\fs20\qvek2797\hich\f1\dbch\f1\loch\f1\cf2\fs20\ul{\field{\*\fldinst HYPERLINK [id number] }{\fldrslt Problem/Plan - 8:}}\plain\f0\fs20\rfei5296\hich\f0\dbch\f0\loch\f0\fs20\ql\par  \'b7  {\*\bkmkstart cy60408019903}{\*\bkmkend ex35338106664}Problem: {\*\bkmkstart eq23608638390}{\*\bkmkend cj66479630941}Need for prophylactic measure. \par  \'b7  {\*\bkmkstart ih77433013253}{\*\bkmkend iq65068954707}Plan: {\*\bkmkstart ls18250579368}{\*\bkmkend hr47807758854}Plan:\par  F: s/p 2L NS in the ED \par  E: replete K<4, Mg<2\par  N: DASH/TLC\par  VTE Prophylaxis: Heparin SubQ\par  C: Full Code\par  D: RMF.\par  \par  \plain\f1\fs20\luxe4438\hich\f1\dbch\f1\loch\f1\cf2\fs20\ul{\field{\*\fldinst HYPERLINK [id number] }{\fldrslt Problem/Plan - 9:}}\plain\f0\fs20\syua6110\hich\f0\dbch\f0\loch\f0\fs20\ql\par  \'b7  {\*\bkmkstart bn60468902885}{\*\bkmkend fw06145278998}Problem: {\*\bkmkstart lf79276743394}{\*\bkmkend mw82125484728}R/O Syncope. \par  \'b7  {\*\bkmkstart tj25212399084}{\*\bkmkend rv69700743758}Plan: {\*\bkmkstart ti17742075019}{\*\bkmkend wu82163152345}Patient presented with an acute LOC with moaning and difficulty speaking as mentioned above\par  Echo: The left ventricle is normal in size, wall thickness, and systolic function with a calculated ejection fraction of 60-65%\par  Orthostics negative \par  \par  - Rest as above.\par  \par  {\*\bkmkstart lq32520843557}{\*\bkmkend iu32839662201}\plain\f1\fs20\qgvn2267\hich\f1\dbch\f1\loch\f1\cf2\fs20\ul Attestation Statements:\plain\f0\fs20\yizu5839\hich\f0\dbch\f0\loch\f0\fs20  \par  \plain\f1\fs20\rzvs8841\hich\f1\dbch\f1\loch\f1\cf2\fs20\ul{\field{\*\fldinst HYPERLINK [id number] }{\fldrslt Attestation Statements:}}\plain\f0\fs20\jhse9671\hich\f0\dbch\f0\loch\f0\fs20\ql\par  {\*\bkmkstart qt701029695361}{\*\bkmkend fw359997821739}I have personally seen and examined the patient.  I fully participated in the care of this patient.  I have made amendments to the documentation where necessary, and agree with the history, physical   exam, and plan as documented by the {\*\bkmkstart qz759909490032}{\*\bkmkend pg409848476222}Resident. \par  \par  {\*\bkmkstart ln179512699779}{\*\bkmkend dj703504071715}Patient was seen and examined at bedside on 1/4/2024 at 1230 pm with sons at bedside. She has no acute complaints, feels dramatically improved. Denies SOB, CP. ROS is otherwise negative. Vitals,   labwork and pertinent imaging reviewed. Physical exam - NAD, AAO x 4, PERRLA, EOMI, supple neck, chest - decreased BS at L base, CV - rrr, s1s2, no m/r/g, abd - soft, + BS, NTND, ext - wwp, psych - normal affect, skin - no rash\par  \par  Plan\par  -Suspect encephalopathy 2/2 dehydration/hypotension but will rule out other causes as well\par  -C/w  CTX\par  -F/u cultures\par  -Repeat CXR Pa/La showing likely LLL infiltrate\par  -Cardiology consult\par  -EEG - no reported seizure\par  -MRSA swab - negative\par  -legionella, strep pneumonia \par  -PT/OT.\par  \plain\f1\fs16\awhs1489\hich\f1\dbch\f1\loch\f1\cf2\fs16\par  \plain\f0\fs20\yfqv6130\hich\f0\dbch\f0\loch\f0\fs20\par  }   {\rtf1\ruihsr21719\ansi\mkrjwmr9031\ftnbj\uc1\deff0  {\fonttbl{\f0 \fnil Segoe UI;}{\f1 \fnil \fcharset0 Segoe UI;}{\f2 \fnil Times New Rahul;}}  {\colortbl ;\cyb185\xscfb447\pzdd591 ;\red0\green0\blue0 ;\red0\green0\ptwb646 ;\red0\green0\blue0 ;}  {\stylesheet{\f0\fs20 Normal;}{\cs1 Default Paragraph Font;}{\cs2\f0\fs16 Line Number;}{\cs3\f2\fs24\ul\cf3 Hyperlink;}}  {\*\revtbl{Unknown;}}  \lancvq54082\gtsqpu02438\fpkko1814\lsbpl1069\htfee5357\xmodw3930\famgfgk487\lwadxdc363\nogrowautofit\vibyja995\formshade\nofeaturethrottle1\dntblnsbdb\fet4\aendnotes\aftnnrlc\pgbrdrhead\pgbrdrfoot  \sectd\rdaqzv63396\mtlfoc51724\guttersxn0\cxhgcxnu4815\yytqtxwk4814\hxognznx8584\tgikhlmi2421\kbtnxqv330\ldbjtxc598\sbkpage\pgncont\pgndec  \plain\plain\f0\fs24\ql\plain\f0\fs24\plain\f0\fs20\anit2454\hich\f0\dbch\f0\loch\f0\fs20\par  I M\par  \par  89 y o F w/ PMHx of HTN, HLD, "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 \par  \'b7  {\*\bkmkstart al75256500199}{\*\bkmkend pl50536748512}Plan: {\*\bkmkstart ev67831487022}{\*\bkmkend gy76148928826}On admission met 3/4 SIRS criteria with 101F, , WBC 15. Likely 2/2 non-COVID SARS vs aspiration PNA vs. less likely UTI. Lactate   3.1, repeat 1.7. CXR unremarkable. U/A negative. S/p Vanc 1g, CTX 1g, 2L NS.\par  BCx no growth 24h\par  \par  - C/w CTX 2g qd \par  - Reperfusion assessment completed.\par  \par  \plain\f1\fs20\msto2522\hich\f1\dbch\f1\loch\f1\cf2\fs20\ul{\field{\*\fldinst HYPERLINK [id number] }{\fldrslt Problem/Plan - 2:}}\plain\f0\fs20\kluy4854\hich\f0\dbch\f0\loch\f0\fs20\ql\par  \'b7  {\*\bkmkstart pt12849291193}{\*\bkmkend bj58641838264}Problem: {\*\bkmkstart wg83520437451}{\*\bkmkend ra86534697892}Metabolic encephalopathy. \par  \'b7  {\*\bkmkstart rd16653572399}{\*\bkmkend is77668724880}Plan: {\*\bkmkstart ex74541805226}{\*\bkmkend vh90280438839}Patient became acutely altered in the car today while driving from [facility] to [facility]. Eyes rolled back. No fecal or urinary incontinence.   No history of seizures. Remote history of CVA. Likely 2/2 TIA vs sepsis from non-COVID SARs vs. HTN. Stroke code in GV unremarkable. \par  Patient is currently AAOx3 without focal deficits, back at baseline\par  Utox (-), TSH 1.92 (on synthoid at home)\par  Echo: The left ventricle is normal in size, wall thickness, and systolic function with a calculated ejection fraction of 60-65%\par  \par  - PT consulted\par  - fall precautions\par  - f/u vEEG placement to rule out seizure.\par  - Remaining as above.\par  \par  \plain\f1\fs20\sbcz1736\hich\f1\dbch\f1\loch\f1\cf2\fs20\ul{\field{\*\fldinst HYPERLINK [id number] }{\fldrslt Problem/Plan - 3:}}\plain\f0\fs20\icfe7932\hich\f0\dbch\f0\loch\f0\fs20\ql\par  \'b7  {\*\bkmkstart su56348751413}{\*\bkmkend qx15323157794}Problem: {\*\bkmkstart ei97284139966}{\*\bkmkend vv19857250844}ATN (acute tubular necrosis).\plain\f1\fs20\mbbn2321\hich\f1\dbch\f1\loch\f1\cf2\fs20\strike\plain\f0\fs20\dvbn4966\hich\f0\dbch\f0\loch\f0\fs20\par  \'b7  {\*\bkmkstart by28367975780}{\*\bkmkend pl13237566454}Plan: {\*\bkmkstart ak76395626034}{\*\bkmkend sa89015051106}resolving\par  On admission Cr: 1.87 normal baseline). Likely 2/2 prerenal 2/2 hypovolemia. \par  \par  U/A with casts\par  \par  - Bladder scan q6h to rule out obstruction\par  - Strict I/Os\par  - Continue to trend Cr\par  - Avoid nephrotoxic agents\par  - Adjust medication dosages for level of renal function.\plain\f1\fs20\keic1528\hich\f1\dbch\f1\loch\f1\cf2\fs20\strike\plain\f0\fs20\tkpx6385\hich\f0\dbch\f0\loch\f0\fs20\par  \par  \plain\f1\fs20\wrlc5153\hich\f1\dbch\f1\loch\f1\cf2\fs20\ul{\field{\*\fldinst HYPERLINK [id number] }{\fldrslt Problem/Plan - 4:}}\plain\f0\fs20\tvyr5210\hich\f0\dbch\f0\loch\f0\fs20\ql\par  \'b7  {\*\bkmkstart xb02857187408}{\*\bkmkend zq38920659087}Problem: {\*\bkmkstart gg31178563029}{\*\bkmkend rg73601894230}Type 2 myocardial infarction. \par  \'b7  {\*\bkmkstart ht30307095102}{\*\bkmkend gj32787285747}Plan: {\*\bkmkstart bn53568249485}{\*\bkmkend ec28486971195}Trop I 103. . Likely elevated 2/2 demand ischemia from sepsis. Patient currently denies any chest discomfort, acute SOB, extremity   pain. [facility] ED spoke to Cardiology, who evaluated EKG and stated likely 2/2 demand ischemia.\par  ECG showed LLLB age not determined\par  Trop: 18 -> 105 -> 74\par  \par  - f/u Cardiology recs.\par  \par  \plain\f1\fs20\bsfw2787\hich\f1\dbch\f1\loch\f1\cf2\fs20\ul{\field{\*\fldinst HYPERLINK [id number] }{\fldrslt Problem/Plan - 5:}}\plain\f0\fs20\jqms4672\hich\f0\dbch\f0\loch\f0\fs20\ql\par  \'b7  {\*\bkmkstart sv62593636566}{\*\bkmkend sd15659622802}Problem: {\*\bkmkstart pf31960941303}{\*\bkmkend lm33129981791}Hypothyroidism. \par  \'b7  {\*\bkmkstart ah27425225347}{\*\bkmkend si82064381501}Plan: {\*\bkmkstart vp03653893301}{\*\bkmkend qy96205126634}TSH 1.92 \par  - c/w home synthroid 75mcg.\par  \par  \plain\f1\fs20\etpy5198\hich\f1\dbch\f1\loch\f1\cf2\fs20\ul{\field{\*\fldinst HYPERLINK [id number] }{\fldrslt Problem/Plan - 6:}}\plain\f0\fs20\jtpm9449\hich\f0\dbch\f0\loch\f0\fs20\ql\par  \'b7  {\*\bkmkstart li30491441170}{\*\bkmkend jo57246324759}Problem: {\*\bkmkstart dt88639627960}{\*\bkmkend gw94447219738}CVA (cerebrovascular accident). \par  \'b7  {\*\bkmkstart xl32781484930}{\*\bkmkend bi56884188585}Plan: {\*\bkmkstart jl05146190426}{\*\bkmkend nz46410778309}Remote hx of CVA 15 years ago. On Lipitor 40mg Stroke code negative for acute event in ED\par  - Stroke consult in AM, appreciate recs\par  - C/w Lipitor 40mg.\par  \par  \plain\f1\fs20\ynzr3485\hich\f1\dbch\f1\loch\f1\cf2\fs20\ul{\field{\*\fldinst HYPERLINK [id number],[id number] }{\fldrslt Problem/Plan - 7:}}\plain\f0\fs20\rxep2790\hich\f0\dbch\f0\loch\f0\fs20\ql\par  \'b7  {\*\bkmkstart am40434526363}{\*\bkmkend rh84504474973}Problem: {\*\bkmkstart vx43756700901}{\*\bkmkend se69246488251}HTN (hypertension). \par  \'b7  {\*\bkmkstart bg48853329388}{\*\bkmkend bp82069789251}Plan: {\*\bkmkstart er69408566938}{\*\bkmkend mn96349160261}On admission, presented with /89. On home Lisinopril 20-HCTZ 12.5mg qd and Toprol 25mg\par  - c/w home Toprol 25mg\par  - Hold Lisinopril 20-HCTZ 12.5mg qd i/s/o normotension.\par  \par  \plain\f1\fs20\iwnj4767\hich\f1\dbch\f1\loch\f1\cf2\fs20\ul{\field{\*\fldinst HYPERLINK [id number] }{\fldrslt Problem/Plan - 8:}}\plain\f0\fs20\szcr9368\hich\f0\dbch\f0\loch\f0\fs20\ql\par  \'b7  {\*\bkmkstart pm56276771643}{\*\bkmkend is04575222868}Problem: {\*\bkmkstart ke50292123666}{\*\bkmkend eq02632009222}Need for prophylactic measure. \par  \'b7  {\*\bkmkstart no77180964686}{\*\bkmkend nm37335421153}Plan: {\*\bkmkstart uz48669676052}{\*\bkmkend sl40828425237}Plan:\par  F: s/p 2L NS in the ED \par  E: replete K<4, Mg<2\par  N: DASH/TLC\par  VTE Prophylaxis: Heparin SubQ\par  C: Full Code\par  D: RMF.\par  \par  \plain\f1\fs20\muzm7623\hich\f1\dbch\f1\loch\f1\cf2\fs20\ul{\field{\*\fldinst HYPERLINK [id number] }{\fldrslt Problem/Plan - 9:}}\plain\f0\fs20\modn5760\hich\f0\dbch\f0\loch\f0\fs20\ql\par  \'b7  {\*\bkmkstart ch93965491780}{\*\bkmkend mj47341303602}Problem: {\*\bkmkstart gn52677647438}{\*\bkmkend sc65036012838}R/O Syncope. \par  \'b7  {\*\bkmkstart cf72216808138}{\*\bkmkend to07961646561}Plan: {\*\bkmkstart ir60048917369}{\*\bkmkend gt75143800226}Patient presented with an acute LOC with moaning and difficulty speaking as mentioned above\par  Echo: The left ventricle is normal in size, wall thickness, and systolic function with a calculated ejection fraction of 60-65%\par  Orthostics negative \par  \par  - Rest as above.\par  \par  {\*\bkmkstart jn32218585366}{\*\bkmkend pi88210045949}\plain\f1\fs20\pban7211\hich\f1\dbch\f1\loch\f1\cf2\fs20\ul Attestation Statements:\plain\f0\fs20\eqgt7579\hich\f0\dbch\f0\loch\f0\fs20  \par  \plain\f1\fs20\dpeh1665\hich\f1\dbch\f1\loch\f1\cf2\fs20\ul{\field{\*\fldinst HYPERLINK [id number] }{\fldrslt Attestation Statements:}}\plain\f0\fs20\djks1188\hich\f0\dbch\f0\loch\f0\fs20\ql\par  {\*\bkmkstart [id number]}{\*\bkmkend [id number]}I have personally seen and examined the patient.  I fully participated in the care of this patient.  I have made amendments to the documentation where necessary, and agree with the history, physical   exam, and plan as documented by the {\*\bkmkstart [id number]}{\*\bkmkend [id number]}Resident. \par  \par  {\*\bkmkstart [id number]}{\*\bkmkend [id number]}Patient was seen and examined at bedside on 1/4/2024 at 1230 pm with sons at bedside. She has no acute complaints, feels dramatically improved. Denies SOB, CP. ROS is otherwise negative. Vitals,   labwork and pertinent imaging reviewed. Physical exam - NAD, AAO x 4, PERRLA, EOMI, supple neck, chest - decreased BS at L base, CV - rrr, s1s2, no m/r/g, abd - soft, + BS, NTND, ext - wwp, psych - normal affect, skin - no rash\par  \par  Plan\par  -Suspect encephalopathy 2/2 dehydration/hypotension but will rule out other causes as well\par  -C/w  CTX\par  -F/u cultures\par  -Repeat CXR Pa/[place] showing likely LLL infiltrate\par  -Cardiology consult\par  -EEG - no reported seizure\par  -MRSA swab - negative\par  -legionella, strep pneumonia \par  -PT/OT.\par  \plain\f1\fs16\aliv2938\hich\f1\dbch\f1\loch\f1\cf2\fs16\par  \plain\f0\fs20\ebvl4066\hich\f0\dbch\f0\loch\f0\fs20\par  }

## 2024-01-05 NOTE — PROGRESS NOTE ADULT - SUBJECTIVE AND OBJECTIVE BOX
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 Daughter (Nora) at bedside to assist with collateral. Patient was planning to go to [place] today to stay with son for a few days. On the car ride over to [place] to  a family member, patient rolled her eyes, started moaning and inability to speak. They brought her to [facility] and she was found to have a BP 60/40 in triage with inability to follow commands. A stroke code was called, which was unremarkable for an acute event. Patient was given empiric antibiotics and IVF. Soon after, patient returned to her baseline mentation. Per daughter, patient does not have history of seizure or cardiac history. Prior to the incident, patient endorsed feeling in her usual state of health. Patient denies any recent sick contacts, fevers, chills, nausea, vomiting, headache, acute sob, chest pain, abdominal pain, genitourinary sx, extremity pain or swelling.     ED Course:  Vitals: 101F, , /89, RR 16(94%) on Room air  Imaging: CTH - no acute hemorrhage, mod chronic microvascular ischemic changes. CT perfusion normal  Consults: None  Interventions: CTX 1g, Vancomycin 1g, Tylenol 1g, 2L NS, Ibuprofen 800mg    (03 Jan 2024 04:18)                            11.3   10.05 )-----------( 317      ( 05 Jan 2024 05:30 )             34.5       01-05    139  |  107  |  15  ----------------------------<  88  4.0   |  23  |  0.91    Ca    8.3<L>      05 Jan 2024 05:30  Phos  2.6     01-05  Mg     1.9     01-05      Vital Signs Last 24 Hrs  T(C): 36.2 (05 Jan 2024 09:49), Max: 36.8 (04 Jan 2024 20:46)  T(F): 97.2 (05 Jan 2024 09:49), Max: 98.2 (04 Jan 2024 20:46)  HR: 72 (05 Jan 2024 09:49) (72 - 78)  BP: 145/68 (05 Jan 2024 09:49) (115/55 - 145/68)  BP(mean): --  RR: 16 (05 Jan 2024 09:49) (16 - 17)  SpO2: 97% (05 Jan 2024 09:49) (95% - 97%)    Parameters below as of 05 Jan 2024 09:49  Patient On (Oxygen Delivery Method): room air        MEDICATIONS  (STANDING):  atorvastatin 40 milliGRAM(s) Oral at bedtime  cefTRIAXone   IVPB 2000 milliGRAM(s) IV Intermittent every 24 hours  heparin   Injectable 5000 Unit(s) SubCutaneous every 8 hours  influenza  Vaccine (HIGH DOSE) 0.7 milliLiter(s) IntraMuscular once  levothyroxine 75 MICROGram(s) Oral daily  metoprolol succinate ER 25 milliGRAM(s) Oral daily    MEDICATIONS  (PRN):  acetaminophen     Tablet .. 650 milliGRAM(s) Oral every 6 hours PRN Temp greater or equal to 38C (100.4F), Mild Pain (1 - 3)  aluminum hydroxide/magnesium hydroxide/simethicone Suspension 30 milliLiter(s) Oral every 4 hours PRN Dyspepsia  melatonin 3 milliGRAM(s) Oral at bedtime PRN Insomnia  ondansetron Injectable 4 milliGRAM(s) IV Push every 8 hours PRN Nausea and/or Vomiting          Initial Functional Status Assessment :       Previous Level of Function:     · Ambulation Skills	independent  · Transfer Skills	independent  · ADL Skills	independent  · Work/Leisure Activity	independent  · Additional Comments	Pt lives with family in a condo, 12 steps inside to use shower, previously independent with all ADLs and mobility, no AD.    Cognitive Status Examination:   · Orientation	oriented to person, place, time and situation  · Level of Consciousness	alert  · Follows Commands and Answers Questions	100% of the time  · Personal Safety and Judgment	intact  · Short Term Memory	intact    Range of Motion Exam:   · Range of Motion Examination	no ROM deficits were identified    Manual Muscle Testing:   · Manual Muscle Testing Results	no strength deficits were identified    Bed Mobility: Rolling/Turning:     · Level of [place]	independent    Bed Mobility: Scooting/Bridging:     · Level of [place]	independent    Bed Mobility: Sit to Supine:     · Level of [place]	stand-by assist    Bed Mobility: Supine to Sit:     · Level of [place]	stand-by assist    Transfer: Sit to Stand:     · Level of [place]	independent    Transfer: Stand to Sit:     · Level of [place]	independent    Transfer: Toilet Transfer:     · Level of [place]	supervision    Gait Skills:     · Level of [place]	independent    Gait Analysis:     · Gait Pattern Used	swing-through gait    Stair Negotiation:     · Level of [place]	supervision  · Weight-Bearing Restrictions	full weight-bearing  · Assistive Device	bilateral rails  · Stair Pattern	step over step  · Number of Stairs	12    Balance Skills Assessment:     · Sitting Balance: Static	normal balance  · Sitting Balance: Dynamic	normal balance  · Sit-to-Stand Balance	good balance  · Standing Balance: Static	good balance  · Standing Balance: Dynamic	good balance    Sensory Examination:   Sensory Examination:    Grossly Intact:   · Gross Sensory Examination	Grossly Intact; LT, denies numbness/tingling        PM&R Impression : as above    Current disposition plan recommendation :       d/c home with no PT/OT needs          Physical Medicine and Rehabilitation Progress Note :       Patient is a 89y old  Female who presents with a chief complaint of SOB (04 Jan 2024 16:02)      HPI:  89-year-old Azeri speaking female w/ a PMHx of HTN, HLD, "minor" 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 Daughter (Nora) at bedside to assist with collateral. Patient was planning to go to [place] today to stay with son for a few days. On the car ride over to [place] to  a family member, patient rolled her eyes, started moaning and inability to speak. They brought her to [facility] and she was found to have a BP 60/40 in triage with inability to follow commands. A stroke code was called, which was unremarkable for an acute event. Patient was given empiric antibiotics and IVF. Soon after, patient returned to her baseline mentation. Per daughter, patient does not have history of seizure or cardiac history. Prior to the incident, patient endorsed feeling in her usual state of health. Patient denies any recent sick contacts, fevers, chills, nausea, vomiting, headache, acute sob, chest pain, abdominal pain, genitourinary sx, extremity pain or swelling.     ED Course:  Vitals: 101F, , /89, RR 16(94%) on Room air  Imaging: CTH - no acute hemorrhage, mod chronic microvascular ischemic changes. CT perfusion normal  Consults: None  Interventions: CTX 1g, Vancomycin 1g, Tylenol 1g, 2L NS, Ibuprofen 800mg    (03 Jan 2024 04:18)                            11.3   10.05 )-----------( 317      ( 05 Jan 2024 05:30 )             34.5       01-05    139  |  107  |  15  ----------------------------<  88  4.0   |  23  |  0.91    Ca    8.3<L>      05 Jan 2024 05:30  Phos  2.6     01-05  Mg     1.9     01-05      Vital Signs Last 24 Hrs  T(C): 36.2 (05 Jan 2024 09:49), Max: 36.8 (04 Jan 2024 20:46)  T(F): 97.2 (05 Jan 2024 09:49), Max: 98.2 (04 Jan 2024 20:46)  HR: 72 (05 Jan 2024 09:49) (72 - 78)  BP: 145/68 (05 Jan 2024 09:49) (115/55 - 145/68)  BP(mean): --  RR: 16 (05 Jan 2024 09:49) (16 - 17)  SpO2: 97% (05 Jan 2024 09:49) (95% - 97%)    Parameters below as of 05 Jan 2024 09:49  Patient On (Oxygen Delivery Method): room air        MEDICATIONS  (STANDING):  atorvastatin 40 milliGRAM(s) Oral at bedtime  cefTRIAXone   IVPB 2000 milliGRAM(s) IV Intermittent every 24 hours  heparin   Injectable 5000 Unit(s) SubCutaneous every 8 hours  influenza  Vaccine (HIGH DOSE) 0.7 milliLiter(s) IntraMuscular once  levothyroxine 75 MICROGram(s) Oral daily  metoprolol succinate ER 25 milliGRAM(s) Oral daily    MEDICATIONS  (PRN):  acetaminophen     Tablet .. 650 milliGRAM(s) Oral every 6 hours PRN Temp greater or equal to 38C (100.4F), Mild Pain (1 - 3)  aluminum hydroxide/magnesium hydroxide/simethicone Suspension 30 milliLiter(s) Oral every 4 hours PRN Dyspepsia  melatonin 3 milliGRAM(s) Oral at bedtime PRN Insomnia  ondansetron Injectable 4 milliGRAM(s) IV Push every 8 hours PRN Nausea and/or Vomiting          Initial Functional Status Assessment :       Previous Level of Function:     · Ambulation Skills	independent  · Transfer Skills	independent  · ADL Skills	independent  · Work/Leisure Activity	independent  · Additional Comments	Pt lives with family in a condo, 12 steps inside to use shower, previously independent with all ADLs and mobility, no AD.    Cognitive Status Examination:   · Orientation	oriented to person, place, time and situation  · Level of Consciousness	alert  · Follows Commands and Answers Questions	100% of the time  · Personal Safety and Judgment	intact  · Short Term Memory	intact    Range of Motion Exam:   · Range of Motion Examination	no ROM deficits were identified    Manual Muscle Testing:   · Manual Muscle Testing Results	no strength deficits were identified    Bed Mobility: Rolling/Turning:     · Level of [place]	independent    Bed Mobility: Scooting/Bridging:     · Level of [place]	independent    Bed Mobility: Sit to Supine:     · Level of [place]	stand-by assist    Bed Mobility: Supine to Sit:     · Level of [place]	stand-by assist    Transfer: Sit to Stand:     · Level of [place]	independent    Transfer: Stand to Sit:     · Level of [place]	independent    Transfer: Toilet Transfer:     · Level of [place]	supervision    Gait Skills:     · Level of [place]	independent    Gait Analysis:     · Gait Pattern Used	swing-through gait    Stair Negotiation:     · Level of [place]	supervision  · Weight-Bearing Restrictions	full weight-bearing  · Assistive Device	bilateral rails  · Stair Pattern	step over step  · Number of Stairs	12    Balance Skills Assessment:     · Sitting Balance: Static	normal balance  · Sitting Balance: Dynamic	normal balance  · Sit-to-Stand Balance	good balance  · Standing Balance: Static	good balance  · Standing Balance: Dynamic	good balance    Sensory Examination:   Sensory Examination:    Grossly Intact:   · Gross Sensory Examination	Grossly Intact; LT, denies numbness/tingling        PM&R Impression : as above    Current disposition plan recommendation :       d/c home with no PT/OT needs

## 2024-01-05 NOTE — PROGRESS NOTE ADULT - SUBJECTIVE AND OBJECTIVE BOX
INTERVAL EVENTS:    PAST MEDICAL & SURGICAL HISTORY:  Hypothyroidism    CVA (cerebrovascular accident)    HTN (hypertension)    No significant past surgical history        MEDICATIONS  (STANDING):  atorvastatin 40 milliGRAM(s) Oral at bedtime  cefTRIAXone   IVPB 2000 milliGRAM(s) IV Intermittent every 24 hours  heparin   Injectable 5000 Unit(s) SubCutaneous every 8 hours  influenza  Vaccine (HIGH DOSE) 0.7 milliLiter(s) IntraMuscular once  levothyroxine 75 MICROGram(s) Oral daily  metoprolol succinate ER 25 milliGRAM(s) Oral daily    MEDICATIONS  (PRN):  acetaminophen     Tablet .. 650 milliGRAM(s) Oral every 6 hours PRN Temp greater or equal to 38C (100.4F), Mild Pain (1 - 3)  aluminum hydroxide/magnesium hydroxide/simethicone Suspension 30 milliLiter(s) Oral every 4 hours PRN Dyspepsia  melatonin 3 milliGRAM(s) Oral at bedtime PRN Insomnia  ondansetron Injectable 4 milliGRAM(s) IV Push every 8 hours PRN Nausea and/or Vomiting    T(F): 97.2 (01-05-24 @ 09:49), Max: 98.2 (01-04-24 @ 20:46)  HR: 72 (01-05-24 @ 09:49) (72 - 78)  BP: 145/68 (01-05-24 @ 09:49) (115/55 - 145/68)  BP(mean): --  ABP: --  ABP(mean): --  RR: 16 (01-05-24 @ 09:49) (16 - 17)  SpO2: 97% (01-05-24 @ 09:49) (95% - 97%)    I/O Detail 24H    04 Jan 2024 07:01  -  05 Jan 2024 07:00  --------------------------------------------------------  IN:    Oral Fluid: 920 mL  Total IN: 920 mL    OUT:    Voided (mL): 1100 mL  Total OUT: 1100 mL    Total NET: -180 mL      05 Jan 2024 07:01  -  05 Jan 2024 14:34  --------------------------------------------------------  IN:  Total IN: 0 mL    OUT:    Voided (mL): 350 mL  Total OUT: 350 mL    Total NET: -350 mL          PHYSICAL EXAM:  GEN: NAD  HEENT: EOMI   RESP: CTA b/l  CV: RRR. Normal S1/S2. No m/r/g.  ABD: soft, non-distended  EXT: No edema   NEURO: alert and attentive    LABS:  CBC 01-05-24 @ 05:30                        11.3   10.05 )-----------( 317                   34.5       Hgb trend: 11.3 <-- , 10.7 <-- , 12.3 <--   WBC trend: 10.05 <-- , 12.55 <-- , 21.14 <--       CMP 01-05-24 @ 05:30    139  |  107  |  15  ----------------------------<  88  4.0   |  23  |  0.91    Ca    8.3<L>      01-05-24 @ 05:30  Phos  2.6     01-05  Mg     1.9     01-05        Serum Cr trend: 0.91 <-- , 1.07 <-- , 1.39 <--         Cardiac Markers           STUDIES:

## 2024-01-05 NOTE — PROGRESS NOTE ADULT - ASSESSMENT
89F PMH HTN, HLD, CVA, hypothyroidism who presented with AMS, found to have sepsis 2/2 nonCOVID coronavirus vs aspiration PNA, a/w ROHIT. Cardiology consulted for possible syncope.    Review of studies  TTE 1/3/23: Septal knuckle without LVOTO. Normal biventricular size/function. G1DD. Mild MR. Mild TR. PASP 40. RA pressure 3  EKG: NSR, LBBB    # Syncope  History suggests vasovagal vs orthostasis in setting of sepsis, less likely cardiac etiology. Orthostatics negative here but this was after IVF given.  TTE without significant disease as above  -In lieu of tele, obtain daily EKG  -Confirmed with cardiologist that LBBB is known. No further workup needed.  -If symptoms recur after treatment of infection, she may benefit from outpatient event monitor  -Please ensure follow-up with her outpatient cardiologist in [place]

## 2024-01-05 NOTE — PROGRESS NOTE ADULT - ATTENDING COMMENTS
Patient is an 89 yo F with PMH HTN, HLD, CVA, Carortic Artery Stenosis and hypothyroidism who presented with AMS, found to have sepsis 2/2 no nCOVID coronavirus with aspiration PNA, a/w ROHIT. Cardiology consulted for Syncope Evaluation    Review of studies  - TTE 1/3/24: Septal knuckle without LVOTO. Normal biventricular size/function. G1DD. Mild MR. Mild TR. PASP 40. RA pressure 3  - EKG 1/3/2024: NSR, LBBB    # Syncope  - Patient was in car with Family member as a passenger when she had an episode of AMS. She reports preceding warmth prior to episode after which family reported she loss consciousness and aspirated.  - Upon admission patient noted to be In severe sepsis now with remarked improvement  - ECG on admission reviewed showing NSR with LBBB  - Echo this hospitalization revealed normal biventricular function with a PASP of 40 mmhG  - Contacted patient's outpatient Cardiologist in [place], Dr Damon Bay who reports chronic known LBBB  - Given prodromal symptoms (Feeling of warmth) preceding the episode and Sepsis noted with significant clinical improvement, believe at this time that this episode was not arrhythmogenic in nature.  - Clinically patient is warm, well compensated without any anginal complaints  - Discussed case at length with patients 2 sons. They will ensure she follows up with her Cardiologist upon return to [place]. She will remain in [facility] for 2 weeks prior to traveling. They know to seek medical attention with any repeat episode  - Discuss role of Ambulatory ECG monitor with ILR should episode reccur as an outpatient Patient is an 89 yo F with PMH HTN, HLD, CVA, Carortic Artery Stenosis and hypothyroidism who presented with AMS, found to have sepsis 2/2 no nCOVID coronavirus with aspiration PNA, a/w ROHIT. Cardiology consulted for Syncope Evaluation    Review of studies  - TTE 1/3/24: Septal knuckle without LVOTO. Normal biventricular size/function. G1DD. Mild MR. Mild TR. PASP 40. RA pressure 3  - EKG 1/3/2024: NSR, LBBB    # Syncope  - Patient was in car with Family member as a passenger when she had an episode of AMS. She reports preceding warmth prior to episode after which family reported she loss consciousness and aspirated.  - Upon admission patient noted to be In severe sepsis now with remarked improvement  - ECG on admission reviewed showing NSR with LBBB  - Echo this hospitalization revealed normal biventricular function with a PASP of 40 mmhG  - Contacted patient's outpatient Cardiologist in [place], Dr Damon Bay who reports chronic known LBBB  - Given prodromal symptoms (Feeling of warmth) preceding the episode and Sepsis noted with significant clinical improvement, believe at this time that this episode was not arrhythmogenic in nature.  - Clinically patient is warm, well compensated without any anginal complaints  - Discussed case at length with patients 2 sons. They will ensure she follows up with her Cardiologist upon return to [place]. She will remain in [facility] for 2 weeks prior to traveling. They know to seek medical attention with any repeat episode  - Discuss role of Ambulatory ECG monitor with ILR should episode reccur as an outpatient

## 2024-01-07 LAB
CULTURE RESULTS: SIGNIFICANT CHANGE UP
SPECIMEN SOURCE: SIGNIFICANT CHANGE UP

## 2024-01-08 PROBLEM — Z00.00 ENCOUNTER FOR PREVENTIVE HEALTH EXAMINATION: Status: ACTIVE | Noted: 2024-01-08

## 2024-01-08 LAB
CULTURE RESULTS: SIGNIFICANT CHANGE UP
CULTURE RESULTS: SIGNIFICANT CHANGE UP
SPECIMEN SOURCE: SIGNIFICANT CHANGE UP
SPECIMEN SOURCE: SIGNIFICANT CHANGE UP

## 2024-01-10 DIAGNOSIS — R65.20 SEVERE SEPSIS WITHOUT SEPTIC SHOCK: ICD-10-CM

## 2024-01-10 DIAGNOSIS — N17.0 ACUTE KIDNEY FAILURE WITH TUBULAR NECROSIS: ICD-10-CM

## 2024-01-10 DIAGNOSIS — R55 SYNCOPE AND COLLAPSE: ICD-10-CM

## 2024-01-10 DIAGNOSIS — A41.89 OTHER SPECIFIED SEPSIS: ICD-10-CM

## 2024-01-10 DIAGNOSIS — Z86.73 PERSONAL HISTORY OF TRANSIENT ISCHEMIC ATTACK (TIA), AND CEREBRAL INFARCTION WITHOUT RESIDUAL DEFICITS: ICD-10-CM

## 2024-01-10 DIAGNOSIS — B97.29 OTHER CORONAVIRUS AS THE CAUSE OF DISEASES CLASSIFIED ELSEWHERE: ICD-10-CM

## 2024-01-10 DIAGNOSIS — Z11.52 ENCOUNTER FOR SCREENING FOR COVID-19: ICD-10-CM

## 2024-01-10 DIAGNOSIS — Z79.890 HORMONE REPLACEMENT THERAPY: ICD-10-CM

## 2024-01-10 DIAGNOSIS — I21.A1 MYOCARDIAL INFARCTION TYPE 2: ICD-10-CM

## 2024-01-10 DIAGNOSIS — G93.41 METABOLIC ENCEPHALOPATHY: ICD-10-CM

## 2024-01-10 DIAGNOSIS — A41.9 SEPSIS, UNSPECIFIED ORGANISM: ICD-10-CM

## 2024-01-10 DIAGNOSIS — I10 ESSENTIAL (PRIMARY) HYPERTENSION: ICD-10-CM

## 2024-01-10 DIAGNOSIS — E86.0 DEHYDRATION: ICD-10-CM

## 2024-01-10 DIAGNOSIS — J69.0 PNEUMONITIS DUE TO INHALATION OF FOOD AND VOMIT: ICD-10-CM

## 2024-01-10 DIAGNOSIS — E89.0 POSTPROCEDURAL HYPOTHYROIDISM: ICD-10-CM
